# Patient Record
Sex: FEMALE | Race: WHITE | NOT HISPANIC OR LATINO | Employment: FULL TIME | URBAN - METROPOLITAN AREA
[De-identification: names, ages, dates, MRNs, and addresses within clinical notes are randomized per-mention and may not be internally consistent; named-entity substitution may affect disease eponyms.]

---

## 2017-01-11 ENCOUNTER — HOSPITAL ENCOUNTER (OUTPATIENT)
Dept: MAMMOGRAPHY | Facility: HOSPITAL | Age: 52
Discharge: HOME | End: 2017-01-11
Attending: NURSE PRACTITIONER | Admitting: NURSE PRACTITIONER

## 2018-10-24 ENCOUNTER — HOSPITAL ENCOUNTER (OUTPATIENT)
Dept: MAMMOGRAPHY | Facility: HOSPITAL | Age: 53
Discharge: HOME | End: 2018-10-24
Attending: NURSE PRACTITIONER | Admitting: NURSE PRACTITIONER

## 2019-10-18 ENCOUNTER — TRANSCRIBE ORDERS (OUTPATIENT)
Dept: ADMINISTRATIVE | Facility: HOSPITAL | Age: 54
End: 2019-10-18

## 2019-10-18 ENCOUNTER — LAB (OUTPATIENT)
Dept: LAB | Facility: HOSPITAL | Age: 54
End: 2019-10-18

## 2019-10-18 DIAGNOSIS — E11.69 TYPE 2 DIABETES MELLITUS WITH OTHER SPECIFIED COMPLICATION, UNSPECIFIED WHETHER LONG TERM INSULIN USE (HCC): ICD-10-CM

## 2019-10-18 DIAGNOSIS — R53.83 MALAISE AND FATIGUE: ICD-10-CM

## 2019-10-18 DIAGNOSIS — E55.9 VITAMIN D DEFICIENCY: ICD-10-CM

## 2019-10-18 DIAGNOSIS — E78.5 HYPERLIPIDEMIA, UNSPECIFIED HYPERLIPIDEMIA TYPE: ICD-10-CM

## 2019-10-18 DIAGNOSIS — R53.81 MALAISE AND FATIGUE: ICD-10-CM

## 2019-10-18 DIAGNOSIS — E11.69 TYPE 2 DIABETES MELLITUS WITH OTHER SPECIFIED COMPLICATION, UNSPECIFIED WHETHER LONG TERM INSULIN USE (HCC): Primary | ICD-10-CM

## 2019-10-18 LAB
25(OH)D3 SERPL-MCNC: 19.3 NG/ML (ref 30–100)
ALBUMIN SERPL-MCNC: 4.7 G/DL (ref 3.5–5.2)
ALBUMIN/GLOB SERPL: 1.3 G/DL
ALP SERPL-CCNC: 86 U/L (ref 39–117)
ALT SERPL W P-5'-P-CCNC: 17 U/L (ref 1–33)
ANION GAP SERPL CALCULATED.3IONS-SCNC: 16.5 MMOL/L (ref 5–15)
AST SERPL-CCNC: 16 U/L (ref 1–32)
BASOPHILS # BLD AUTO: 0.01 10*3/MM3 (ref 0–0.2)
BASOPHILS NFR BLD AUTO: 0.2 % (ref 0–1.5)
BILIRUB SERPL-MCNC: 0.5 MG/DL (ref 0.2–1.2)
BUN BLD-MCNC: 20 MG/DL (ref 6–20)
BUN/CREAT SERPL: 27 (ref 7–25)
CALCIUM SPEC-SCNC: 9.9 MG/DL (ref 8.6–10.5)
CHLORIDE SERPL-SCNC: 99 MMOL/L (ref 98–107)
CHOLEST SERPL-MCNC: 289 MG/DL (ref 0–200)
CO2 SERPL-SCNC: 22.5 MMOL/L (ref 22–29)
CREAT BLD-MCNC: 0.74 MG/DL (ref 0.57–1)
DEPRECATED RDW RBC AUTO: 42.3 FL (ref 37–54)
EOSINOPHIL # BLD AUTO: 0.21 10*3/MM3 (ref 0–0.4)
EOSINOPHIL NFR BLD AUTO: 3.2 % (ref 0.3–6.2)
ERYTHROCYTE [DISTWIDTH] IN BLOOD BY AUTOMATED COUNT: 12.2 % (ref 12.3–15.4)
GFR SERPL CREATININE-BSD FRML MDRD: 81 ML/MIN/1.73
GLOBULIN UR ELPH-MCNC: 3.6 GM/DL
GLUCOSE BLD-MCNC: 98 MG/DL (ref 65–99)
HCT VFR BLD AUTO: 41 % (ref 34–46.6)
HDLC SERPL-MCNC: 52 MG/DL (ref 40–60)
HGB BLD-MCNC: 14 G/DL (ref 12–15.9)
IMM GRANULOCYTES # BLD AUTO: 0.02 10*3/MM3 (ref 0–0.05)
IMM GRANULOCYTES NFR BLD AUTO: 0.3 % (ref 0–0.5)
LDLC SERPL CALC-MCNC: 177 MG/DL (ref 0–100)
LDLC/HDLC SERPL: 3.4 {RATIO}
LYMPHOCYTES # BLD AUTO: 1.96 10*3/MM3 (ref 0.7–3.1)
LYMPHOCYTES NFR BLD AUTO: 29.8 % (ref 19.6–45.3)
MCH RBC QN AUTO: 32.2 PG (ref 26.6–33)
MCHC RBC AUTO-ENTMCNC: 34.1 G/DL (ref 31.5–35.7)
MCV RBC AUTO: 94.3 FL (ref 79–97)
MONOCYTES # BLD AUTO: 0.62 10*3/MM3 (ref 0.1–0.9)
MONOCYTES NFR BLD AUTO: 9.4 % (ref 5–12)
NEUTROPHILS # BLD AUTO: 3.75 10*3/MM3 (ref 1.7–7)
NEUTROPHILS NFR BLD AUTO: 57.1 % (ref 42.7–76)
NRBC BLD AUTO-RTO: 0 /100 WBC (ref 0–0.2)
PLATELET # BLD AUTO: 246 10*3/MM3 (ref 140–450)
PMV BLD AUTO: 10.7 FL (ref 6–12)
POTASSIUM BLD-SCNC: 4.3 MMOL/L (ref 3.5–5.2)
PROT SERPL-MCNC: 8.3 G/DL (ref 6–8.5)
RBC # BLD AUTO: 4.35 10*6/MM3 (ref 3.77–5.28)
SODIUM BLD-SCNC: 138 MMOL/L (ref 136–145)
T4 FREE SERPL-MCNC: 1.29 NG/DL (ref 0.93–1.7)
TRIGL SERPL-MCNC: 300 MG/DL (ref 0–150)
TSH SERPL DL<=0.05 MIU/L-ACNC: 2.06 UIU/ML (ref 0.27–4.2)
VLDLC SERPL-MCNC: 60 MG/DL (ref 5–40)
WBC NRBC COR # BLD: 6.57 10*3/MM3 (ref 3.4–10.8)

## 2019-10-18 PROCEDURE — 36415 COLL VENOUS BLD VENIPUNCTURE: CPT

## 2019-10-18 PROCEDURE — 82306 VITAMIN D 25 HYDROXY: CPT

## 2019-10-18 PROCEDURE — 80061 LIPID PANEL: CPT

## 2019-10-18 PROCEDURE — 84443 ASSAY THYROID STIM HORMONE: CPT

## 2019-10-18 PROCEDURE — 84439 ASSAY OF FREE THYROXINE: CPT

## 2019-10-18 PROCEDURE — 80053 COMPREHEN METABOLIC PANEL: CPT

## 2019-10-18 PROCEDURE — 85025 COMPLETE CBC W/AUTO DIFF WBC: CPT

## 2019-10-24 ENCOUNTER — APPOINTMENT (OUTPATIENT)
Dept: GENERAL RADIOLOGY | Facility: HOSPITAL | Age: 54
End: 2019-10-24

## 2019-10-24 ENCOUNTER — HOSPITAL ENCOUNTER (EMERGENCY)
Facility: HOSPITAL | Age: 54
Discharge: HOME | End: 2019-10-24
Admitting: EMERGENCY MEDICINE

## 2019-10-24 VITALS
DIASTOLIC BLOOD PRESSURE: 80 MMHG | RESPIRATION RATE: 20 BRPM | TEMPERATURE: 98.2 F | SYSTOLIC BLOOD PRESSURE: 154 MMHG | BODY MASS INDEX: 39.14 KG/M2 | WEIGHT: 229.28 LBS | HEART RATE: 74 BPM | HEIGHT: 64 IN | OXYGEN SATURATION: 98 %

## 2019-10-24 DIAGNOSIS — S63.502A SPRAIN OF LEFT WRIST, INITIAL ENCOUNTER: Primary | ICD-10-CM

## 2019-10-24 DIAGNOSIS — S50.02XA CONTUSION OF LEFT ELBOW, INITIAL ENCOUNTER: ICD-10-CM

## 2019-10-24 PROCEDURE — 73110 X-RAY EXAM OF WRIST: CPT

## 2019-10-24 PROCEDURE — 73090 X-RAY EXAM OF FOREARM: CPT

## 2019-10-24 PROCEDURE — 73070 X-RAY EXAM OF ELBOW: CPT

## 2019-10-24 PROCEDURE — 99284 EMERGENCY DEPT VISIT MOD MDM: CPT

## 2019-10-24 RX ORDER — HYDROCODONE BITARTRATE AND ACETAMINOPHEN 5; 325 MG/1; MG/1
1 TABLET ORAL ONCE AS NEEDED
Status: DISCONTINUED | OUTPATIENT
Start: 2019-10-24 | End: 2019-10-25 | Stop reason: HOSPADM

## 2019-10-24 RX ORDER — HYDROCODONE BITARTRATE AND ACETAMINOPHEN 5; 325 MG/1; MG/1
1 TABLET ORAL EVERY 6 HOURS PRN
Qty: 8 TABLET | Refills: 0 | Status: SHIPPED | OUTPATIENT
Start: 2019-10-24 | End: 2021-05-19 | Stop reason: ALTCHOICE

## 2019-10-24 RX ADMIN — HYDROCODONE BITARTRATE AND ACETAMINOPHEN 1 TABLET: 5; 325 TABLET ORAL at 22:04

## 2019-10-25 NOTE — ED PROVIDER NOTES
Subjective   Patient is a 54-year-old white female with history of diabetes and high cholesterol who presents today with complaints of pain to her left arm.  She states she fell onto her left arm after she slipped while trying to close the door of her barn.  Complains of pain at the left wrist, forearm and elbow it is worse with movement.  She denies any numbness tingling or weakness in the arm or hand.  She denies any other injury or complaint.            Review of Systems   Musculoskeletal:        Left arm pain   Neurological: Negative for weakness and numbness.       Past Medical History:   Diagnosis Date   • Diabetes mellitus (CMS/HCC)    • Hyperlipidemia        Allergies   Allergen Reactions   • Tetanus Toxoid Rash   • Doxycycline Angioedema       Past Surgical History:   Procedure Laterality Date   • BLADDER SURGERY     • HYSTERECTOMY         History reviewed. No pertinent family history.    Social History     Socioeconomic History   • Marital status:      Spouse name: Not on file   • Number of children: Not on file   • Years of education: Not on file   • Highest education level: Not on file   Tobacco Use   • Smoking status: Never Smoker   Substance and Sexual Activity   • Alcohol use: No     Frequency: Never   • Drug use: No           Objective   Physical Exam   Constitutional: She appears well-developed.   Vital signs and triage nurse note reviewed.  Constitutional: Awake, alert; well-developed and well-nourished. No acute distress is noted.  Cardiovascular: Regular rate and rhythm  Pulmonary: Respiratory effort regular nonlabored  Musculoskeletal: Independent range of motion of all extremities.  Mild tenderness over the mid left forearm.  Range of motion of the left wrist and elbow elicits pain.  There is no obvious edema or ecchymosis.  No open wounds.  Distal neurovascular motor intact.  Neuro: Alert oriented x3, speech is clear and appropriate, GCS 15.    Skin: Flesh tone, warm, dry, intact; no  erythematous or petechial rash or lesion.        Procedures           ED Course      Labs Reviewed - No data to display  Xr Elbow 2 View Left    Result Date: 10/24/2019  Normal study.  Electronically Signed By-Ashutosh Zelaya On:10/24/2019 9:46 PM This report was finalized on [id number] by  Ashutosh Zelaya, .    Xr Forearm 2 View Left    Result Date: 10/24/2019  Normal study.  Electronically Signed By-Ashutosh Zelaya On:10/24/2019 9:47 PM This report was finalized on [id number] by  Ashutosh Zelaya, .    Xr Wrist 3+ View Left    Result Date: 10/24/2019  Normal study.  Electronically Signed By-Ashutosh Zelaya On:10/24/2019 9:45 PM This report was finalized on [id number] by  Ashutohs Zelaya, .    Medications   HYDROcodone-acetaminophen (NORCO) 5-325 MG per tablet 1 tablet (1 tablet Oral Given 10/24/19 2200)                 MDM  Number of Diagnoses or Management Options  Contusion of left elbow, initial encounter:   Sprain of left wrist, initial encounter:      Amount and/or Complexity of Data Reviewed  Tests in the radiology section of CPT®: ordered and reviewed    Risk of Complications, Morbidity, and/or Mortality  General comments: Comorbidities: Diabetes, high cholesterol  Differentials: Sprain, fracture, dislocation;this list is not all inclusive and does not constitute the entirety of considered causes    Patient had x-rays obtained.  She was given one Norco for pain.    The patient had a Velcro wrist splint, Ace wrap and sling applied.  Distal motor, sensory and vascular status intact after application.     Diagnosis and treatment plan discussed with patient.  Patient agreeable to plan.   I discussed findings with patient who voices understanding of discharge instructions, signs and symptoms requiring return to ED; discharged improved and in stable condition with follow up for re-evaluation.  Prescription for Norco.  Inspect reviewed.      Patient Progress  Patient progress: stable      Final diagnoses:   Sprain of left wrist,  initial encounter   Contusion of left elbow, initial encounter              Lorraine Singh, APRN  10/24/19 7325

## 2019-10-25 NOTE — DISCHARGE INSTRUCTIONS
Wear splint Ace wrap and sling until pain improves.  Use Tylenol ibuprofen as needed for pain.  Ice.  Elevate.  Follow-up with orthopedics if symptoms persist.  Return for new or worsening symptoms.

## 2019-10-25 NOTE — ED NOTES
Tripped and fell outside tonight. Landed on her left arm. Pain from left shoulder down     Leandra Daugherty RN  10/24/19 2043

## 2019-11-20 ENCOUNTER — CONSULT (OUTPATIENT)
Dept: CARDIOLOGY | Facility: CLINIC | Age: 54
End: 2019-11-20

## 2019-11-20 VITALS
DIASTOLIC BLOOD PRESSURE: 86 MMHG | WEIGHT: 224 LBS | OXYGEN SATURATION: 98 % | HEIGHT: 64 IN | BODY MASS INDEX: 38.24 KG/M2 | SYSTOLIC BLOOD PRESSURE: 135 MMHG | HEART RATE: 77 BPM

## 2019-11-20 DIAGNOSIS — Z91.89 MULTIPLE RISK FACTORS FOR CORONARY ARTERY DISEASE: ICD-10-CM

## 2019-11-20 DIAGNOSIS — E78.1 PURE HYPERTRIGLYCERIDEMIA: Primary | ICD-10-CM

## 2019-11-20 DIAGNOSIS — E11.9 TYPE 2 DIABETES MELLITUS WITHOUT COMPLICATION, WITHOUT LONG-TERM CURRENT USE OF INSULIN (HCC): ICD-10-CM

## 2019-11-20 DIAGNOSIS — I10 ESSENTIAL HYPERTENSION: ICD-10-CM

## 2019-11-20 PROCEDURE — 93000 ELECTROCARDIOGRAM COMPLETE: CPT | Performed by: NURSE PRACTITIONER

## 2019-11-20 PROCEDURE — 99203 OFFICE O/P NEW LOW 30 MIN: CPT | Performed by: NURSE PRACTITIONER

## 2019-11-20 RX ORDER — CHOLECALCIFEROL (VITAMIN D3) 125 MCG
500 CAPSULE ORAL DAILY
COMMUNITY
End: 2022-11-23

## 2019-11-20 RX ORDER — MULTIVIT WITH MINERALS/LUTEIN
1000 TABLET ORAL DAILY
COMMUNITY

## 2019-11-20 RX ORDER — PROPRANOLOL HCL 60 MG
CAPSULE, EXTENDED RELEASE 24HR ORAL NIGHTLY
COMMUNITY
Start: 2019-09-25 | End: 2020-11-18 | Stop reason: SDUPTHER

## 2019-11-20 RX ORDER — CHOLECALCIFEROL (VITAMIN D3) 1250 MCG
CAPSULE ORAL
COMMUNITY
Start: 2015-09-17

## 2019-11-20 RX ORDER — ACETAMINOPHEN 500 MG
TABLET ORAL NIGHTLY
COMMUNITY
Start: 2015-09-17

## 2019-11-20 RX ORDER — LISINOPRIL 5 MG/1
10 TABLET ORAL DAILY
COMMUNITY
Start: 2019-09-25 | End: 2020-06-24 | Stop reason: SDUPTHER

## 2019-11-20 RX ORDER — METFORMIN HYDROCHLORIDE 500 MG/1
TABLET, EXTENDED RELEASE ORAL
COMMUNITY
Start: 2019-09-25 | End: 2022-11-23 | Stop reason: ALTCHOICE

## 2019-11-20 RX ORDER — MULTIVITAMIN
TABLET ORAL
COMMUNITY
Start: 2012-11-07

## 2019-11-20 RX ORDER — ECHINACEA 400 MG
CAPSULE ORAL DAILY
COMMUNITY
Start: 2012-11-07

## 2019-11-20 RX ORDER — ESCITALOPRAM OXALATE 10 MG/1
5 TABLET ORAL DAILY
COMMUNITY
Start: 2019-09-25 | End: 2021-05-19 | Stop reason: ALTCHOICE

## 2019-11-20 RX ORDER — PIOGLITAZONEHYDROCHLORIDE 30 MG/1
TABLET ORAL
COMMUNITY
Start: 2019-09-25

## 2019-11-20 RX ORDER — FEXOFENADINE HCL 180 MG/1
TABLET ORAL AS NEEDED
COMMUNITY
Start: 2012-11-07

## 2019-11-20 RX ORDER — EZETIMIBE 10 MG/1
TABLET ORAL
COMMUNITY
Start: 2019-09-25 | End: 2019-11-21 | Stop reason: ALTCHOICE

## 2019-11-20 NOTE — PROGRESS NOTES
"Cardiology Office Consultation      Encounter Date:  11/20/2019    Patient ID:   Isidra Gann is a 54 y.o. female.    Reason For Consultation:  Dyslipidemia      History of Present Illness:  Dear  Tania Connell APRN    It was my pleasure to see Isidra in the office today.  As you are aware, she is a very pleasant 54-year-old  female with no known history of coronary artery disease.  She does have risk factors that include obesity, dyslipidemia, diabetes mellitus type 2 and paternal family history of heart disease.  She presents today in new consultation for dyslipidemia.  Patient reports that she is intolerant to all statins secondary to myalgias.  Lipids have been suboptimal on Trilipix and niacin's as well.  She is currently on monotherapy with Zetia.  Lipid panel 10/18/2019 with total cholesterol 289, triglycerides 300, .  Patient reports her diabetes is well controlled with A1c consistently less than 6.    Patient denies any recent symptoms of chest discomfort, shortness of breath, lower extremity edema, dizziness or lightheadedness.  We discussed dietary modifications for improvement of lipids.  Patient stated she is hesitant to begin PSK 9 secondary to adverse side effects she has read about.      Assessment & Plan    Impressions:  Dyslipidemia-specifically, hypertriglyceridemia and elevated LDL.  Diabetes mellitus type 2  Obesity-BMI 38.5  Paternal family history of coronary artery disease    Recommendations:  Reviewed options with patient including injectables.  At this point, would recommend best Vascepa 2 g p.o. twice daily.  Will give samples from our office.  Discontinue Zetia.  Recheck lipids in 8 weeks.  If lipids remain suboptimal, would recommend moving forward with PSK9-Repatha or Praluent.    Objective:    Vitals:  Vitals:    11/20/19 1316   BP: 135/86   Pulse: 77   SpO2: 98%   Weight: 102 kg (224 lb)   Height: 162.6 cm (64\")       ROS    Physical Exam:   "   General: Well-developed, obese 54-year-old  female with BMI 38.5 who is alert, cooperative, no distress, appears stated age  Head:  Normocephalic, atraumatic, mucous membranes moist  Eyes:  Conjunctiva/corneas clear, EOM's intact     Neck:  Supple,  no adenopathy;      Lungs: Clear to auscultation bilaterally, no wheezes rhonchi rales are noted  Chest wall: No tenderness  Heart::  Regular rate and rhythm, S1 and S2 normal, no murmur, rub or gallop  Abdomen: Soft, non-tender, nondistended bowel sounds active  Extremities: No cyanosis, clubbing, or edema  Pulses: 2+ and symmetric all extremities  Skin:  No rashes or lesions  Neuro/psych: A&O x3. CN II through XII are grossly intact with appropriate affect    History of Present Illness      Allergies:  Allergies   Allergen Reactions   • Tetanus Toxoid Rash   • Doxycycline Angioedema       Medication Review:     Current Outpatient Medications:   •  acetaminophen (TYLENOL) 500 MG tablet, Every Night., Disp: , Rfl:   •  Cholecalciferol (VITAMIN D3) 1.25 MG (99554 UT) capsule, VITAMIN D3 44466 UNIT CAPS, Disp: , Rfl:   •  escitalopram (LEXAPRO) 10 MG tablet, 5 mg Daily., Disp: , Rfl:   •  fexofenadine (KP FEXOFENADINE HCL) 180 MG tablet, As Needed., Disp: , Rfl:   •  Flaxseed, Linseed, (FLAXSEED OIL) 1000 MG capsule, Daily., Disp: , Rfl:   •  lisinopril (PRINIVIL,ZESTRIL) 5 MG tablet, , Disp: , Rfl:   •  metFORMIN ER (GLUCOPHAGE-XR) 500 MG 24 hr tablet, , Disp: , Rfl:   •  Multiple Vitamin tablet, MULTIPLE VITAMIN TABS, Disp: , Rfl:   •  pioglitazone (ACTOS) 30 MG tablet, , Disp: , Rfl:   •  propranolol LA (INDERAL LA) 60 MG 24 hr capsule, Every Night., Disp: , Rfl:   •  vitamin B-12 (CYANOCOBALAMIN) 500 MCG tablet, Take 500 mcg by mouth Daily., Disp: , Rfl:   •  vitamin E 1000 UNIT capsule, Take 1,000 Units by mouth Daily., Disp: , Rfl:   •  HYDROcodone-acetaminophen (NORCO) 5-325 MG per tablet, Take 1 tablet by mouth Every 6 (Six) Hours As Needed for Moderate  Pain ., Disp: 8 tablet, Rfl: 0    Family History:  Family History   Problem Relation Age of Onset   • Heart disease Father    • Hyperlipidemia Father    • Hypertension Brother        Past Medical History:  Past Medical History:   Diagnosis Date   • Diabetes mellitus (CMS/HCC)    • Hyperlipidemia        Past surgical History:  Past Surgical History:   Procedure Laterality Date   • BLADDER SURGERY     • HYSTERECTOMY         Social History:  Social History     Socioeconomic History   • Marital status:      Spouse name: Not on file   • Number of children: Not on file   • Years of education: Not on file   • Highest education level: Not on file   Tobacco Use   • Smoking status: Never Smoker   Substance and Sexual Activity   • Alcohol use: No     Frequency: Never   • Drug use: No       Review of Systems:  The following systems were reviewed as they relate to the cardiovascular system: Constitutional, Eyes, ENT, Cardiovascular, Respiratory, Gastrointestinal, Integumentary, Neurological, Psychiatric, Hematologic, Endocrine, Musculoskeletal, and Genitourinary. The pertinent cardiovascular findings are reported above with all other cardiovascular points within those systems being negative.    Diagnostic Study Review:     Current Electrocardiogram:    ECG 12 Lead  Date/Time: 11/20/2019 3:34 PM  Performed by: Aaliyah Alvarado APRN  Authorized by: Aaliyah Alvarado APRN   Comparison: not compared with previous ECG   Previous ECG: no previous ECG available  Rhythm: sinus rhythm  BPM: 74              Most recent Cardiac Catheterization:  Date:  Findings:    Most Recent Echocardiogram:  Date:  Findings:    Most Recent Stress Test:  Date:  Findings:    Most Recent Ambulatory ECG Monitor:  Date:  Findings:    NOTE: The following portions of the patient's history were reviewed and updated this visit as appropriate: allergies, current medications, past family history, past medical history, past social history, past  surgical history and problem list.

## 2019-11-21 PROBLEM — E11.9 TYPE 2 DIABETES MELLITUS (HCC): Status: ACTIVE | Noted: 2019-11-21

## 2019-11-21 PROBLEM — E78.5 HYPERLIPIDEMIA: Status: ACTIVE | Noted: 2019-11-21

## 2019-11-21 PROBLEM — I10 HYPERTENSION: Status: ACTIVE | Noted: 2018-12-07

## 2020-02-05 ENCOUNTER — OFFICE VISIT (OUTPATIENT)
Dept: CARDIOLOGY | Facility: CLINIC | Age: 55
End: 2020-02-05

## 2020-02-05 VITALS
DIASTOLIC BLOOD PRESSURE: 85 MMHG | HEIGHT: 64 IN | BODY MASS INDEX: 38.93 KG/M2 | HEART RATE: 69 BPM | SYSTOLIC BLOOD PRESSURE: 144 MMHG | WEIGHT: 228 LBS | OXYGEN SATURATION: 98 %

## 2020-02-05 DIAGNOSIS — E78.2 MIXED HYPERLIPIDEMIA: ICD-10-CM

## 2020-02-05 DIAGNOSIS — I10 ESSENTIAL HYPERTENSION: ICD-10-CM

## 2020-02-05 DIAGNOSIS — E11.9 TYPE 2 DIABETES MELLITUS WITHOUT COMPLICATION, WITHOUT LONG-TERM CURRENT USE OF INSULIN (HCC): ICD-10-CM

## 2020-02-05 DIAGNOSIS — I10 HTN (HYPERTENSION), BENIGN: Primary | ICD-10-CM

## 2020-02-05 PROCEDURE — 99214 OFFICE O/P EST MOD 30 MIN: CPT | Performed by: NURSE PRACTITIONER

## 2020-02-05 RX ORDER — ICOSAPENT ETHYL 1000 MG/1
2 CAPSULE ORAL 2 TIMES DAILY WITH MEALS
Qty: 120 CAPSULE | Refills: 3 | Status: SHIPPED | OUTPATIENT
Start: 2020-02-05 | End: 2021-05-19

## 2020-02-06 NOTE — PROGRESS NOTES
[facility] CARDIOLOGY      REASON FOR FOLLOW-UP:  Dyslipidemia  Hypertension          Chief Complaint   Patient presents with   • Hyperlipidemia     2 month f/u         Dear Tania,    History of Present Illness     It was my pleasure to see Isidra in the office today.  As you are aware, she is a very pleasant 54-year-old  female with no known history of coronary artery disease.  She does have risk factors that include obesity, dyslipidemia, diabetes mellitus type 2 and paternal family history of heart disease.  She presented in new consultation for dyslipidemia.  Patient reports that she is intolerant to all statins secondary to myalgias.  Lipids have been suboptimal on Trilipix and niacin's as well. She was on monotherapy with Zetia.  Lipid panel 10/18/2019 with total cholesterol 289, triglycerides 300, .  Patient reported her diabetes is well controlled with A1c consistently less than 6.  She was started on Vascepa and samples were given.  She presents today in follow-up for the above diagnoses.     Patient denies any recent symptoms of chest discomfort, shortness of breath, lower extremity edema, dizziness or lightheadedness.    Labs have been ordered but were not obtained.  Her blood pressure is somewhat elevated today at 144/85.  She has never had baseline echocardiogram    Assessment:  Dyslipidemia-specifically, hypertriglyceridemia and elevated LDL.  Diabetes mellitus type 2  Obesity-BMI 38.5  Paternal family history of coronary artery disease        Plan:  We will reorder labs  Patient is tolerating medication well  2D echo to evaluate for any structural heart changes      The following portions of the patient's history were reviewed and updated as appropriate: allergies, current medications, past family history, past medical history, past social history, past surgical history and problem list.    REVIEW OF SYSTEMS:    Review of Systems   All other systems reviewed  and are negative.      Vitals:    02/05/20 1444   BP: 144/85   Pulse: 69   SpO2: 98%         PHYSICAL EXAM:    General: Well-developed, obese 54-year-old  female with BMI of 39.1 who is alert, cooperative, no distress, appears stated age  Head:  Normocephalic, atraumatic, mucous membranes moist  Eyes:  Conjunctiva/corneas clear, EOM's intact     Neck:  Supple,  no JVD or bruit     Lungs: Clear to auscultation bilaterally, no wheezes rhonchi rales are noted  Chest wall: No tenderness  Musculoskeletal:   Ambulates freely without assistance  Heart::  Regular rate and rhythm, S1 and S2 normal, no murmur, rub or gallop  Abdomen: Soft, non-tender, nondistended bowel sounds active, no abdominal bruit  Extremities: No cyanosis, clubbing, or edema   Pulses: 2+ and symmetric all extremities  Skin:  No rashes or lesions  Neuro/psych: A&O x3. CN II through XII are grossly intact with appropriate affect        Past Medical History:   Diagnosis Date   • Diabetes mellitus (CMS/HCC)    • Hyperlipidemia        Past Surgical History:   Procedure Laterality Date   • BLADDER SURGERY     • HYSTERECTOMY           Current Outpatient Medications:   •  acetaminophen (TYLENOL) 500 MG tablet, Every Night., Disp: , Rfl:   •  Cholecalciferol (VITAMIN D3) 1.25 MG (09823 UT) capsule, VITAMIN D3 86767 UNIT CAPS, Disp: , Rfl:   •  escitalopram (LEXAPRO) 10 MG tablet, 5 mg Daily., Disp: , Rfl:   •  fexofenadine (KP FEXOFENADINE HCL) 180 MG tablet, As Needed., Disp: , Rfl:   •  Flaxseed, Linseed, (FLAXSEED OIL) 1000 MG capsule, Daily., Disp: , Rfl:   •  HYDROcodone-acetaminophen (NORCO) 5-325 MG per tablet, Take 1 tablet by mouth Every 6 (Six) Hours As Needed for Moderate Pain ., Disp: 8 tablet, Rfl: 0  •  lisinopril (PRINIVIL,ZESTRIL) 5 MG tablet, , Disp: , Rfl:   •  metFORMIN ER (GLUCOPHAGE-XR) 500 MG 24 hr tablet, , Disp: , Rfl:   •  Multiple Vitamin tablet, MULTIPLE VITAMIN TABS, Disp: , Rfl:   •  pioglitazone (ACTOS) 30 MG tablet, ,  Disp: , Rfl:   •  propranolol LA (INDERAL LA) 60 MG 24 hr capsule, Every Night., Disp: , Rfl:   •  vitamin B-12 (CYANOCOBALAMIN) 500 MCG tablet, Take 500 mcg by mouth Daily., Disp: , Rfl:   •  vitamin E 1000 UNIT capsule, Take 1,000 Units by mouth Daily., Disp: , Rfl:   •  icosapent ethyl (VASCEPA) 1 g capsule capsule, Take 2 g by mouth 2 (Two) Times a Day With Meals., Disp: 120 capsule, Rfl: 3    Allergies   Allergen Reactions   • Tetanus Toxoid Rash   • Doxycycline Angioedema       Family History   Problem Relation Age of Onset   • Heart disease Father    • Hyperlipidemia Father    • Hypertension Brother        Social History     Tobacco Use   • Smoking status: Never Smoker   • Smokeless tobacco: Never Used   Substance Use Topics   • Alcohol use: No     Frequency: Never           Current Electrocardiogram:  Procedures      ALEKSANDRA Lopes  02/06/20  1:22 PM

## 2020-02-07 ENCOUNTER — LAB (OUTPATIENT)
Dept: FAMILY MEDICINE CLINIC | Facility: CLINIC | Age: 55
End: 2020-02-07

## 2020-02-07 DIAGNOSIS — E78.1 PURE HYPERTRIGLYCERIDEMIA: ICD-10-CM

## 2020-02-07 DIAGNOSIS — E78.5 HYPERLIPIDEMIA, UNSPECIFIED HYPERLIPIDEMIA TYPE: Primary | ICD-10-CM

## 2020-02-07 DIAGNOSIS — E11.9 TYPE 2 DIABETES MELLITUS WITHOUT COMPLICATION, WITHOUT LONG-TERM CURRENT USE OF INSULIN (HCC): ICD-10-CM

## 2020-02-07 DIAGNOSIS — I10 HTN (HYPERTENSION), BENIGN: ICD-10-CM

## 2020-02-07 PROCEDURE — 80061 LIPID PANEL: CPT | Performed by: NURSE PRACTITIONER

## 2020-02-08 LAB
CHOLEST SERPL-MCNC: 310 MG/DL (ref 0–200)
HDLC SERPL-MCNC: 48 MG/DL (ref 40–60)
LDLC SERPL CALC-MCNC: 203 MG/DL (ref 0–100)
LDLC/HDLC SERPL: 4.22 {RATIO}
TRIGL SERPL-MCNC: 297 MG/DL (ref 0–150)
VLDLC SERPL-MCNC: 59.4 MG/DL (ref 5–40)

## 2020-02-10 ENCOUNTER — TELEPHONE (OUTPATIENT)
Dept: CARDIOLOGY | Facility: CLINIC | Age: 55
End: 2020-02-10

## 2020-02-10 DIAGNOSIS — E78.5 HYPERLIPIDEMIA, UNSPECIFIED HYPERLIPIDEMIA TYPE: Primary | ICD-10-CM

## 2020-02-10 RX ORDER — ATORVASTATIN CALCIUM 20 MG/1
20 TABLET, FILM COATED ORAL DAILY
Qty: 90 TABLET | Refills: 3 | Status: SHIPPED | OUTPATIENT
Start: 2020-02-10 | End: 2020-06-24 | Stop reason: SINTOL

## 2020-02-10 NOTE — TELEPHONE ENCOUNTER
Left VM for patient regarding elevation in lipid panel. Called in atorvastatin 20mg for patient to  at [facility] in [place] per VORV by IRINA LAIRD.

## 2020-06-24 ENCOUNTER — OFFICE VISIT (OUTPATIENT)
Dept: CARDIOLOGY | Facility: CLINIC | Age: 55
End: 2020-06-24

## 2020-06-24 VITALS
BODY MASS INDEX: 39.82 KG/M2 | WEIGHT: 232 LBS | TEMPERATURE: 98.5 F | HEART RATE: 80 BPM | SYSTOLIC BLOOD PRESSURE: 163 MMHG | DIASTOLIC BLOOD PRESSURE: 89 MMHG | OXYGEN SATURATION: 99 %

## 2020-06-24 DIAGNOSIS — E11.9 TYPE 2 DIABETES MELLITUS WITHOUT COMPLICATION, WITHOUT LONG-TERM CURRENT USE OF INSULIN (HCC): ICD-10-CM

## 2020-06-24 DIAGNOSIS — E78.2 MIXED HYPERLIPIDEMIA: Primary | ICD-10-CM

## 2020-06-24 DIAGNOSIS — I10 ESSENTIAL HYPERTENSION: ICD-10-CM

## 2020-06-24 DIAGNOSIS — E66.01 SEVERE OBESITY (BMI 35.0-39.9) WITH COMORBIDITY (HCC): ICD-10-CM

## 2020-06-24 PROCEDURE — 99213 OFFICE O/P EST LOW 20 MIN: CPT | Performed by: NURSE PRACTITIONER

## 2020-06-24 RX ORDER — LISINOPRIL 10 MG/1
10 TABLET ORAL DAILY
Qty: 30 TABLET | Refills: 5 | Status: SHIPPED | OUTPATIENT
Start: 2020-06-24 | End: 2020-12-14

## 2020-06-24 RX ORDER — FENOFIBRATE 48 MG/1
48 TABLET, COATED ORAL DAILY
Qty: 30 TABLET | Refills: 5 | Status: SHIPPED | OUTPATIENT
Start: 2020-06-24 | End: 2021-03-26

## 2020-06-24 NOTE — PROGRESS NOTES
[facility] CARDIOLOGY      REASON FOR FOLLOW-UP:  Hypertension  Dyslipidemia          Chief Complaint   Patient presents with   • Hypertension     F/U from OV in Feb-here for echo results.   • Hyperlipidemia     Could not tolerate Lipitor.         Dear Tania,        History of Present Illness     It was my pleasure to see Isidra in the office today.  As you are aware, she is a very pleasant 54-year-old  female with no known history of coronary artery disease.  She does have risk factors that include obesity, dyslipidemia, diabetes mellitus type 2 and paternal family history of heart disease.  She presented in new consultation for dyslipidemia.  Patient reports that she is intolerant to all statins secondary to myalgias.  Lipids have been suboptimal on Trilipix and niacin's as well. She was on monotherapy with Zetia.  Lipid panel 10/18/2019 with total cholesterol 289, triglycerides 300, .  Patient reported her diabetes is well controlled with A1c consistently less than 6.  She was started on Vascepa and samples were given.  She presents today in follow-up echo results.  2D echo shows mild to moderate asymmetric hypertrophy with EF 60%, mild TR.  Left atrial cavity mildly dilated..     Patient denies any recent symptoms of chest discomfort, shortness of breath, lower extremity edema, dizziness or lightheadedness.   Lipid panel shows triglycerides 297, .   2D echo results were reviewed with the patient in detail today.        Assessment:  Dyslipidemia-specifically, hypertriglyceridemia and elevated LDL.  Diabetes mellitus type 2  Obesity-BMI 38.5  Paternal family history of coronary artery disease  Hypertension with hypertensive cardiovascular disease    Plan:  We will start TriCor.  Patient will continue over-the-counter omega-3's as well.  She is intolerant to statins.  Recommend aggressive risk factor modification including weight loss  Increase lisinopril to 10 mg  once daily  Follow-up in 3 months        The following portions of the patient's history were reviewed and updated as appropriate: allergies, current medications, past family history, past medical history, past social history, past surgical history and problem list.    REVIEW OF SYSTEMS:    Review of Systems   Constitution: Positive for malaise/fatigue.   All other systems reviewed and are negative.      Vitals:    06/24/20 1322   BP: 163/89   Pulse: 80   Temp: 98.5 °F (36.9 °C)   SpO2: 99%         PHYSICAL EXAM:    General: Well-developed, obese 54-year-old  female who is alert, cooperative, no distress, appears stated age  Head:  Normocephalic, atraumatic, mucous membranes moist  Eyes:  Conjunctiva/corneas clear, EOM's intact     Neck:  Supple,  no JVD or bruit     Lungs: Clear to auscultation bilaterally, no wheezes rhonchi rales are noted  Chest wall: No tenderness  Musculoskeletal:   Ambulates freely without assistance  Heart::  Regular rate and rhythm, S1 and S2 normal, no murmur, rub or gallop  Abdomen: Soft, non-tender, nondistended, bowel sounds active, no abdominal bruit  Extremities: No cyanosis, clubbing, or edema   Pulses: 2+ and symmetric all extremities  Skin:  No rashes or lesions  Neuro/psych: A&O x3. CN II through XII are grossly intact with appropriate affect        Past Medical History:   Diagnosis Date   • Diabetes mellitus (CMS/HCC)    • Hyperlipidemia        Past Surgical History:   Procedure Laterality Date   • BLADDER SURGERY     • HYSTERECTOMY           Current Outpatient Medications:   •  acetaminophen (TYLENOL) 500 MG tablet, Every Night., Disp: , Rfl:   •  Cholecalciferol (VITAMIN D3) 1.25 MG (62391 UT) capsule, VITAMIN D3 39126 UNIT CAPS, Disp: , Rfl:   •  fexofenadine (KP FEXOFENADINE HCL) 180 MG tablet, As Needed., Disp: , Rfl:   •  Flaxseed, Linseed, (FLAXSEED OIL) 1000 MG capsule, Daily., Disp: , Rfl:   •  icosapent ethyl (VASCEPA) 1 g capsule capsule, Take 2 g by mouth 2  "(Two) Times a Day With Meals., Disp: 120 capsule, Rfl: 3  •  lisinopril (PRINIVIL,ZESTRIL) 10 MG tablet, Take 1 tablet by mouth Daily., Disp: 30 tablet, Rfl: 5  •  metFORMIN ER (GLUCOPHAGE-XR) 500 MG 24 hr tablet, , Disp: , Rfl:   •  Multiple Vitamin tablet, MULTIPLE VITAMIN TABS, Disp: , Rfl:   •  pioglitazone (ACTOS) 30 MG tablet, , Disp: , Rfl:   •  propranolol LA (INDERAL LA) 60 MG 24 hr capsule, Every Night., Disp: , Rfl:   •  vitamin B-12 (CYANOCOBALAMIN) 500 MCG tablet, Take 500 mcg by mouth Daily., Disp: , Rfl:   •  vitamin E 1000 UNIT capsule, Take 1,000 Units by mouth Daily., Disp: , Rfl:   •  escitalopram (LEXAPRO) 10 MG tablet, 5 mg Daily., Disp: , Rfl:   •  fenofibrate (TRICOR) 48 MG tablet, Take 1 tablet by mouth Daily., Disp: 30 tablet, Rfl: 5  •  HYDROcodone-acetaminophen (NORCO) 5-325 MG per tablet, Take 1 tablet by mouth Every 6 (Six) Hours As Needed for Moderate Pain ., Disp: 8 tablet, Rfl: 0    Allergies   Allergen Reactions   • Tetanus Toxoid Rash   • Atorvastatin GI Intolerance and Myalgia   • Doxycycline Angioedema       Family History   Problem Relation Age of Onset   • Heart disease Father    • Hyperlipidemia Father    • Hypertension Brother        Social History     Tobacco Use   • Smoking status: Never Smoker   • Smokeless tobacco: Never Used   Substance Use Topics   • Alcohol use: No     Frequency: Never           Current Electrocardiogram:  Procedures      Aaliyah Alvarado, ALEKSANDRA  06/26/20  16:27      EMR Dragon/Transcription:   \"Dictated utilizing Dragon dictation\".         "

## 2020-06-26 PROBLEM — E66.01 SEVERE OBESITY (BMI 35.0-39.9) WITH COMORBIDITY (HCC): Status: ACTIVE | Noted: 2020-06-26

## 2020-10-13 ENCOUNTER — LAB (OUTPATIENT)
Dept: LAB | Facility: HOSPITAL | Age: 55
End: 2020-10-13

## 2020-10-13 ENCOUNTER — TRANSCRIBE ORDERS (OUTPATIENT)
Dept: ADMINISTRATIVE | Facility: HOSPITAL | Age: 55
End: 2020-10-13

## 2020-10-13 DIAGNOSIS — E13.9: ICD-10-CM

## 2020-10-13 DIAGNOSIS — E13.9: Primary | ICD-10-CM

## 2020-10-13 DIAGNOSIS — E55.9 VITAMIN D DEFICIENCY, UNSPECIFIED: ICD-10-CM

## 2020-10-13 DIAGNOSIS — D64.9 ANEMIA, UNSPECIFIED TYPE: ICD-10-CM

## 2020-10-13 LAB
25(OH)D3 SERPL-MCNC: 30 NG/ML (ref 30–100)
ALBUMIN SERPL-MCNC: 4.2 G/DL (ref 3.5–5.2)
ALBUMIN/GLOB SERPL: 1.4 G/DL
ALP SERPL-CCNC: 87 U/L (ref 39–117)
ALT SERPL W P-5'-P-CCNC: 19 U/L (ref 1–33)
ANION GAP SERPL CALCULATED.3IONS-SCNC: 10.5 MMOL/L (ref 5–15)
AST SERPL-CCNC: 17 U/L (ref 1–32)
BASOPHILS # BLD AUTO: 0.02 10*3/MM3 (ref 0–0.2)
BASOPHILS NFR BLD AUTO: 0.3 % (ref 0–1.5)
BILIRUB SERPL-MCNC: 0.6 MG/DL (ref 0–1.2)
BUN SERPL-MCNC: 18 MG/DL (ref 6–20)
BUN/CREAT SERPL: 22.8 (ref 7–25)
CALCIUM SPEC-SCNC: 9.3 MG/DL (ref 8.6–10.5)
CHLORIDE SERPL-SCNC: 103 MMOL/L (ref 98–107)
CO2 SERPL-SCNC: 23.5 MMOL/L (ref 22–29)
CREAT SERPL-MCNC: 0.79 MG/DL (ref 0.57–1)
DEPRECATED RDW RBC AUTO: 41.1 FL (ref 37–54)
EOSINOPHIL # BLD AUTO: 0.21 10*3/MM3 (ref 0–0.4)
EOSINOPHIL NFR BLD AUTO: 3.3 % (ref 0.3–6.2)
ERYTHROCYTE [DISTWIDTH] IN BLOOD BY AUTOMATED COUNT: 12.4 % (ref 12.3–15.4)
GFR SERPL CREATININE-BSD FRML MDRD: 75 ML/MIN/1.73
GLOBULIN UR ELPH-MCNC: 2.9 GM/DL
GLUCOSE SERPL-MCNC: 115 MG/DL (ref 65–99)
HBA1C MFR BLD: 6 % (ref 3.5–5.6)
HCT VFR BLD AUTO: 38.1 % (ref 34–46.6)
HGB BLD-MCNC: 13 G/DL (ref 12–15.9)
IMM GRANULOCYTES # BLD AUTO: 0.03 10*3/MM3 (ref 0–0.05)
IMM GRANULOCYTES NFR BLD AUTO: 0.5 % (ref 0–0.5)
LYMPHOCYTES # BLD AUTO: 2.17 10*3/MM3 (ref 0.7–3.1)
LYMPHOCYTES NFR BLD AUTO: 34.5 % (ref 19.6–45.3)
MCH RBC QN AUTO: 31.4 PG (ref 26.6–33)
MCHC RBC AUTO-ENTMCNC: 34.1 G/DL (ref 31.5–35.7)
MCV RBC AUTO: 92 FL (ref 79–97)
MONOCYTES # BLD AUTO: 0.57 10*3/MM3 (ref 0.1–0.9)
MONOCYTES NFR BLD AUTO: 9.1 % (ref 5–12)
NEUTROPHILS NFR BLD AUTO: 3.29 10*3/MM3 (ref 1.7–7)
NEUTROPHILS NFR BLD AUTO: 52.3 % (ref 42.7–76)
NRBC BLD AUTO-RTO: 0 /100 WBC (ref 0–0.2)
PLATELET # BLD AUTO: 262 10*3/MM3 (ref 140–450)
PMV BLD AUTO: 10.4 FL (ref 6–12)
POTASSIUM SERPL-SCNC: 4.1 MMOL/L (ref 3.5–5.2)
PROT SERPL-MCNC: 7.1 G/DL (ref 6–8.5)
RBC # BLD AUTO: 4.14 10*6/MM3 (ref 3.77–5.28)
SODIUM SERPL-SCNC: 137 MMOL/L (ref 136–145)
T4 FREE SERPL-MCNC: 1.39 NG/DL (ref 0.93–1.7)
TSH SERPL DL<=0.05 MIU/L-ACNC: 1.72 UIU/ML (ref 0.27–4.2)
WBC # BLD AUTO: 6.29 10*3/MM3 (ref 3.4–10.8)

## 2020-10-13 PROCEDURE — 84443 ASSAY THYROID STIM HORMONE: CPT

## 2020-10-13 PROCEDURE — 84439 ASSAY OF FREE THYROXINE: CPT

## 2020-10-13 PROCEDURE — 83036 HEMOGLOBIN GLYCOSYLATED A1C: CPT

## 2020-10-13 PROCEDURE — 80053 COMPREHEN METABOLIC PANEL: CPT

## 2020-10-13 PROCEDURE — 82306 VITAMIN D 25 HYDROXY: CPT

## 2020-10-13 PROCEDURE — 36415 COLL VENOUS BLD VENIPUNCTURE: CPT

## 2020-10-13 PROCEDURE — 85025 COMPLETE CBC W/AUTO DIFF WBC: CPT

## 2020-11-10 ENCOUNTER — TRANSCRIBE ORDERS (OUTPATIENT)
Dept: ADMINISTRATIVE | Facility: HOSPITAL | Age: 55
End: 2020-11-10

## 2020-11-10 DIAGNOSIS — Z12.31 SCREENING MAMMOGRAM, ENCOUNTER FOR: Primary | ICD-10-CM

## 2020-11-18 ENCOUNTER — OFFICE VISIT (OUTPATIENT)
Dept: CARDIOLOGY | Facility: CLINIC | Age: 55
End: 2020-11-18

## 2020-11-18 VITALS
SYSTOLIC BLOOD PRESSURE: 132 MMHG | WEIGHT: 230 LBS | BODY MASS INDEX: 39.48 KG/M2 | OXYGEN SATURATION: 98 % | DIASTOLIC BLOOD PRESSURE: 84 MMHG | HEART RATE: 77 BPM

## 2020-11-18 DIAGNOSIS — E11.9 TYPE 2 DIABETES MELLITUS WITHOUT COMPLICATION, WITHOUT LONG-TERM CURRENT USE OF INSULIN (HCC): ICD-10-CM

## 2020-11-18 DIAGNOSIS — E66.01 SEVERE OBESITY (BMI 35.0-39.9) WITH COMORBIDITY (HCC): ICD-10-CM

## 2020-11-18 DIAGNOSIS — I10 ESSENTIAL HYPERTENSION: ICD-10-CM

## 2020-11-18 DIAGNOSIS — Z82.49 FAMILY HISTORY OF CORONARY ARTERY DISEASE IN FATHER: ICD-10-CM

## 2020-11-18 DIAGNOSIS — E78.2 MIXED HYPERLIPIDEMIA: Primary | ICD-10-CM

## 2020-11-18 PROCEDURE — 99213 OFFICE O/P EST LOW 20 MIN: CPT | Performed by: NURSE PRACTITIONER

## 2020-11-18 PROCEDURE — 93000 ELECTROCARDIOGRAM COMPLETE: CPT | Performed by: NURSE PRACTITIONER

## 2020-11-18 RX ORDER — CALCIUM CARBONATE/VITAMIN D3 600 MG-10
TABLET ORAL DAILY
COMMUNITY

## 2020-11-18 RX ORDER — PROPRANOLOL HCL 60 MG
60 CAPSULE, EXTENDED RELEASE 24HR ORAL DAILY
Qty: 90 CAPSULE | Refills: 2 | Status: SHIPPED | OUTPATIENT
Start: 2020-11-18 | End: 2021-10-05

## 2020-11-18 NOTE — PROGRESS NOTES
[facility] CARDIOLOGY      REASON FOR FOLLOW-UP:  Hypertension  Dyslipidemia          Chief Complaint   Patient presents with   • Hypertension     3-6 month f/u   • Hyperlipidemia         Dear Tania,    History of Present Illness     It was my pleasure to see Isidra in the office today.  As you are aware, she is a very pleasant 55-year-old  female with no known history of coronary artery disease.  She does have risk factors that include obesity, dyslipidemia, diabetes mellitus type 2, hypertension and paternal family history of heart disease.  She is intolerant to all statins secondary to myalgias.  Lipid panel 10/18/2019 with total cholesterol 289, triglycerides 300, .  Patient reported her diabetes is well controlled with A1c consistently less than 6.  She was started on TriCor and continue over-the-counter omega-3's.  Recent 2D echo shows mild to moderate asymmetric hypertrophy with EF 60%, mild TR.  Left atrial cavity mildly dilated.. She presents today in follow-up of medication change.    Today, the patient reports that she feels well.  Specifically, she denies any chest pains, pressure, tightness or palpitations.  She denies any shortness of breath at rest, dyspnea with exertion, orthopnea or PND.  She has had no lower extremity edema, dizziness or lightheadedness.  Blood pressure is under excellent control at 132/84.  She had recent labs, unfortunately that did not include lipids.  A1c was quite good at 6.0       Assessment:  Dyslipidemia  Hypertension  Diabetes mellitus 2  Obesity  Intolerance to statins  Risk factors for coronary artery disease  Family history of coronary artery disease  Hypertensive cardiovascular disease    Plan:  Lipids tomorrow  Continue current medications  Continue aggressive risk factor modification  Follow-up in 6 months or sooner if needed        The following portions of the patient's history were reviewed and updated as appropriate:  allergies, current medications, past family history, past medical history, past social history, past surgical history and problem list.    REVIEW OF SYSTEMS:    Review of Systems   All other systems reviewed and are negative.      Vitals:    11/18/20 1312   BP: 132/84   Pulse: 77   SpO2: 98%         PHYSICAL EXAM:    General: Alert, cooperative, no distress, appears stated age  Head:  Normocephalic, atraumatic, mucous membranes moist  Eyes:  Conjunctiva/corneas clear, EOM's intact     Neck:  Supple,  no JVD or bruit     Lungs: Clear to auscultation bilaterally, no wheezes rhonchi rales are noted  Chest wall: No tenderness  Musculoskeletal:   Ambulates freely without assistance  Heart::  Regular rate and rhythm, S1 and S2 normal, no murmur, rub or gallop  Abdomen: Soft, non-tender, nondistended, bowel sounds active, no abdominal bruit  Extremities: No cyanosis, clubbing, or edema   Pulses: 2+ and symmetric all extremities  Skin:  No rashes or lesions  Neuro/psych: A&O x3. CN II through XII are grossly intact with appropriate affect        Past Medical History:   Diagnosis Date   • Diabetes mellitus (CMS/HCC)    • Hyperlipidemia        Past Surgical History:   Procedure Laterality Date   • BLADDER SURGERY     • HYSTERECTOMY           Current Outpatient Medications:   •  acetaminophen (TYLENOL) 500 MG tablet, Every Night., Disp: , Rfl:   •  Cholecalciferol (VITAMIN D3) 1.25 MG (24827 UT) capsule, VITAMIN D3 20983 UNIT CAPS, Disp: , Rfl:   •  fenofibrate (TRICOR) 48 MG tablet, Take 1 tablet by mouth Daily., Disp: 30 tablet, Rfl: 5  •  fexofenadine (KP FEXOFENADINE HCL) 180 MG tablet, As Needed., Disp: , Rfl:   •  Flaxseed, Linseed, (FLAXSEED OIL) 1000 MG capsule, Daily., Disp: , Rfl:   •  lisinopril (PRINIVIL,ZESTRIL) 10 MG tablet, Take 1 tablet by mouth Daily., Disp: 30 tablet, Rfl: 5  •  metFORMIN ER (GLUCOPHAGE-XR) 500 MG 24 hr tablet, , Disp: , Rfl:   •  Multiple Vitamin tablet, MULTIPLE VITAMIN TABS, Disp: , Rfl:  "  •  Omega 3 1200 MG capsule, Take  by mouth Daily., Disp: , Rfl:   •  pioglitazone (ACTOS) 30 MG tablet, , Disp: , Rfl:   •  propranolol LA (INDERAL LA) 60 MG 24 hr capsule, Every Night., Disp: , Rfl:   •  vitamin B-12 (CYANOCOBALAMIN) 500 MCG tablet, Take 500 mcg by mouth Daily., Disp: , Rfl:   •  vitamin E 1000 UNIT capsule, Take 1,000 Units by mouth Daily., Disp: , Rfl:   •  escitalopram (LEXAPRO) 10 MG tablet, 5 mg Daily., Disp: , Rfl:   •  HYDROcodone-acetaminophen (NORCO) 5-325 MG per tablet, Take 1 tablet by mouth Every 6 (Six) Hours As Needed for Moderate Pain ., Disp: 8 tablet, Rfl: 0  •  icosapent ethyl (VASCEPA) 1 g capsule capsule, Take 2 g by mouth 2 (Two) Times a Day With Meals., Disp: 120 capsule, Rfl: 3    Allergies   Allergen Reactions   • Tetanus Toxoid Rash   • Atorvastatin GI Intolerance and Myalgia   • Doxycycline Angioedema       Family History   Problem Relation Age of Onset   • Heart disease Father    • Hyperlipidemia Father    • Hypertension Brother        Social History     Tobacco Use   • Smoking status: Never Smoker   • Smokeless tobacco: Never Used   Substance Use Topics   • Alcohol use: No     Frequency: Never           Current Electrocardiogram:    ECG 12 Lead    Date/Time: 11/18/2020 2:28 PM  Performed by: Aaliyah Alvarado APRN  Authorized by: Aaliyah Alvarado APRN   Comparison: not compared with previous ECG   Rhythm: sinus rhythm  BPM: 75                  EMR Dragon/Transcription:   \"Dictated utilizing Dragon dictation\".         "

## 2020-11-19 ENCOUNTER — LAB (OUTPATIENT)
Dept: FAMILY MEDICINE CLINIC | Facility: CLINIC | Age: 55
End: 2020-11-19

## 2020-11-19 DIAGNOSIS — E78.2 MIXED HYPERLIPIDEMIA: ICD-10-CM

## 2020-11-19 LAB
CHOLEST SERPL-MCNC: 288 MG/DL (ref 0–200)
HDLC SERPL-MCNC: 59 MG/DL (ref 40–60)
LDLC SERPL CALC-MCNC: 186 MG/DL (ref 0–100)
LDLC/HDLC SERPL: 3.11 {RATIO}
TRIGL SERPL-MCNC: 227 MG/DL (ref 0–150)
VLDLC SERPL-MCNC: 43 MG/DL (ref 5–40)

## 2020-11-19 PROCEDURE — 80061 LIPID PANEL: CPT | Performed by: NURSE PRACTITIONER

## 2020-11-19 PROCEDURE — 36415 COLL VENOUS BLD VENIPUNCTURE: CPT | Performed by: NURSE PRACTITIONER

## 2020-12-02 ENCOUNTER — APPOINTMENT (OUTPATIENT)
Dept: MAMMOGRAPHY | Facility: HOSPITAL | Age: 55
End: 2020-12-02

## 2020-12-09 ENCOUNTER — HOSPITAL ENCOUNTER (OUTPATIENT)
Dept: MAMMOGRAPHY | Facility: HOSPITAL | Age: 55
Discharge: HOME | End: 2020-12-09
Admitting: NURSE PRACTITIONER

## 2020-12-09 DIAGNOSIS — Z12.31 SCREENING MAMMOGRAM, ENCOUNTER FOR: ICD-10-CM

## 2020-12-09 PROCEDURE — 77063 BREAST TOMOSYNTHESIS BI: CPT

## 2020-12-09 PROCEDURE — 77067 SCR MAMMO BI INCL CAD: CPT

## 2020-12-14 RX ORDER — LISINOPRIL 10 MG/1
10 TABLET ORAL DAILY
Qty: 30 TABLET | Refills: 5 | Status: SHIPPED | OUTPATIENT
Start: 2020-12-14 | End: 2021-06-15

## 2021-03-26 RX ORDER — FENOFIBRATE 48 MG/1
48 TABLET, COATED ORAL DAILY
Qty: 90 TABLET | Refills: 1 | Status: SHIPPED | OUTPATIENT
Start: 2021-03-26

## 2021-05-19 ENCOUNTER — OFFICE VISIT (OUTPATIENT)
Dept: CARDIOLOGY | Facility: CLINIC | Age: 56
End: 2021-05-19

## 2021-05-19 VITALS
SYSTOLIC BLOOD PRESSURE: 117 MMHG | DIASTOLIC BLOOD PRESSURE: 79 MMHG | OXYGEN SATURATION: 95 % | BODY MASS INDEX: 39.48 KG/M2 | WEIGHT: 230 LBS | HEART RATE: 75 BPM

## 2021-05-19 DIAGNOSIS — E66.01 SEVERE OBESITY (BMI 35.0-39.9) WITH COMORBIDITY (HCC): ICD-10-CM

## 2021-05-19 DIAGNOSIS — Z91.89 MULTIPLE RISK FACTORS FOR CORONARY ARTERY DISEASE: ICD-10-CM

## 2021-05-19 DIAGNOSIS — E78.2 MIXED HYPERLIPIDEMIA: Primary | ICD-10-CM

## 2021-05-19 DIAGNOSIS — I10 ESSENTIAL HYPERTENSION: ICD-10-CM

## 2021-05-19 PROCEDURE — 99213 OFFICE O/P EST LOW 20 MIN: CPT | Performed by: NURSE PRACTITIONER

## 2021-05-19 PROCEDURE — 93000 ELECTROCARDIOGRAM COMPLETE: CPT | Performed by: NURSE PRACTITIONER

## 2021-05-19 RX ORDER — CLONAZEPAM 0.25 MG/1
0.25 TABLET, ORALLY DISINTEGRATING ORAL NIGHTLY PRN
COMMUNITY

## 2021-05-19 NOTE — PROGRESS NOTES
[facility] CARDIOLOGY      REASON FOR FOLLOW-UP:  Hypertension  Dyslipidemia             Chief Complaint   Patient presents with   • Hypertension   • Hyperlipidemia         Dear Tania Connell APRN        History of Present Illness     It was my pleasure to see Isidra in the office today.  As you are aware, she is a very pleasant 55-year-old  female with no known history of coronary artery disease.  She does have risk factors that include obesity, dyslipidemia, diabetes mellitus type 2, hypertension and paternal family history of heart disease.  She is intolerant to all statins secondary to myalgias.  Lipid panel 10/18/2019 with total cholesterol 289, triglycerides 300, .  Patient reported her diabetes is well controlled with A1c consistently less than 6.  She was started on TriCor and continue over-the-counter omega-3's.  Recent 2D echo shows mild to moderate asymmetric hypertrophy with EF 60%, mild TR.  Left atrial cavity mildly dilated.. She presents today in follow-up of medication change.    Patient's blood pressure today is under excellent control at 117/79.  She denies any symptoms of chest pain, pressure or tightness.  She denies any change in respiratory status.  She does have lower extremity edema today particularly dorsum of both feet.  She denies dizziness or lightheadedness.  No abnormal bleeding.    ASSESSMENT:  Dyslipidemia  Hypertension  Diabetes mellitus 2  Obesity  Intolerance to statins  Risk factors for coronary artery disease  Family history of coronary artery disease  Hypertensive cardiovascular disease        PLAN:  Continue aggressive risk factor modification  Continue current CV plan of care  Close monitoring of fluid and sodium intake  Follow-up in 6 months or sooner if needed        The following portions of the patient's history were reviewed and updated as appropriate: allergies, current medications, past family history, past medical  history, past social history, past surgical history and problem list.    REVIEW OF SYSTEMS:    Review of Systems   Cardiovascular: Positive for leg swelling.   All other systems reviewed and are negative.      Vitals:    05/19/21 1413   BP: 117/79   Pulse: 75   SpO2: 95%         PHYSICAL EXAM:    General: Alert, cooperative, no distress, appears stated age  Head:  Normocephalic, atraumatic, mucous membranes moist  Eyes:  Conjunctiva/corneas clear, EOM's intact     Neck:  Supple,  no JVD or bruit     Lungs: Clear to auscultation bilaterally, no wheezes rhonchi rales are noted  Chest wall: No tenderness  Musculoskeletal:   Ambulates freely without assistance  Heart::  Regular rate and rhythm, S1 and S2 normal, no murmur, rub or gallop  Abdomen: Soft, non-tender, nondistended, bowel sounds active, no abdominal bruit  Extremities: No cyanosis, clubbing, or edema   Pulses: 2+ and symmetric all extremities  Skin:  No rashes or lesions  Neuro/psych: A&O x3. CN II through XII are grossly intact with appropriate affect        Past Medical History:   Diagnosis Date   • Diabetes mellitus (CMS/HCC)    • Hyperlipidemia        Past Surgical History:   Procedure Laterality Date   • BLADDER SURGERY     • HYSTERECTOMY           Current Outpatient Medications:   •  acetaminophen (TYLENOL) 500 MG tablet, Every Night., Disp: , Rfl:   •  Cholecalciferol (VITAMIN D3) 1.25 MG (06988 UT) capsule, VITAMIN D3 07803 UNIT CAPS, Disp: , Rfl:   •  clonazePAM (KlonoPIN) 0.25 MG disintegrating tablet, Place 0.25 mg on the tongue At Night As Needed., Disp: , Rfl:   •  fenofibrate (TRICOR) 48 MG tablet, TAKE 1 TABLET BY MOUTH DAILY., Disp: 90 tablet, Rfl: 1  •  fexofenadine (KP FEXOFENADINE HCL) 180 MG tablet, As Needed., Disp: , Rfl:   •  Flaxseed, Linseed, (FLAXSEED OIL) 1000 MG capsule, Daily., Disp: , Rfl:   •  lisinopril (PRINIVIL,ZESTRIL) 10 MG tablet, TAKE 1 TABLET BY MOUTH DAILY., Disp: 30 tablet, Rfl: 5  •  metFORMIN ER (GLUCOPHAGE-XR) 500  "MG 24 hr tablet, , Disp: , Rfl:   •  Multiple Vitamin tablet, MULTIPLE VITAMIN TABS, Disp: , Rfl:   •  Omega 3 1200 MG capsule, Take  by mouth Daily., Disp: , Rfl:   •  pioglitazone (ACTOS) 30 MG tablet, , Disp: , Rfl:   •  propranolol LA (INDERAL LA) 60 MG 24 hr capsule, Take 1 capsule by mouth Daily., Disp: 90 capsule, Rfl: 2  •  vitamin B-12 (CYANOCOBALAMIN) 500 MCG tablet, Take 500 mcg by mouth Daily., Disp: , Rfl:   •  vitamin E 1000 UNIT capsule, Take 1,000 Units by mouth Daily., Disp: , Rfl:   •  escitalopram (LEXAPRO) 10 MG tablet, 5 mg Daily., Disp: , Rfl:   •  HYDROcodone-acetaminophen (NORCO) 5-325 MG per tablet, Take 1 tablet by mouth Every 6 (Six) Hours As Needed for Moderate Pain ., Disp: 8 tablet, Rfl: 0  •  icosapent ethyl (VASCEPA) 1 g capsule capsule, Take 2 g by mouth 2 (Two) Times a Day With Meals., Disp: 120 capsule, Rfl: 3    Allergies   Allergen Reactions   • Tetanus Toxoid Rash   • Atorvastatin GI Intolerance and Myalgia   • Doxycycline Angioedema       Family History   Problem Relation Age of Onset   • Heart disease Father    • Hyperlipidemia Father    • Hypertension Brother        Social History     Tobacco Use   • Smoking status: Never Smoker   • Smokeless tobacco: Never Used   Substance Use Topics   • Alcohol use: No           Current Electrocardiogram:    ECG 12 Lead    Date/Time: 5/19/2021 3:31 PM  Performed by: Aaliyah Alvarado APRN  Authorized by: Aaliyah Alvarado APRN   Comparison: compared with previous ECG from 11/18/2020  Rhythm: sinus rhythm  BPM: 75                  EMR Dragon/Transcription:   \"Dictated utilizing Dragon dictation\".       "

## 2021-06-15 RX ORDER — LISINOPRIL 10 MG/1
10 TABLET ORAL DAILY
Qty: 90 TABLET | Refills: 1 | Status: SHIPPED | OUTPATIENT
Start: 2021-06-15 | End: 2021-11-08

## 2021-09-15 DIAGNOSIS — E78.1 HYPERTRIGLYCERIDEMIA: ICD-10-CM

## 2021-09-15 DIAGNOSIS — E11.9 TYPE 2 DIABETES MELLITUS WITHOUT COMPLICATION, WITHOUT LONG-TERM CURRENT USE OF INSULIN (HCC): ICD-10-CM

## 2021-09-15 DIAGNOSIS — E78.2 MIXED HYPERLIPIDEMIA: Primary | ICD-10-CM

## 2021-09-15 DIAGNOSIS — E66.01 SEVERE OBESITY (BMI 35.0-39.9) WITH COMORBIDITY (HCC): ICD-10-CM

## 2021-10-04 DIAGNOSIS — Z91.89 MULTIPLE RISK FACTORS FOR CORONARY ARTERY DISEASE: Primary | ICD-10-CM

## 2021-10-04 DIAGNOSIS — E78.1 PURE HYPERTRIGLYCERIDEMIA: ICD-10-CM

## 2021-10-05 RX ORDER — PROPRANOLOL HCL 60 MG
CAPSULE, EXTENDED RELEASE 24HR ORAL
Qty: 90 CAPSULE | Refills: 2 | Status: SHIPPED | OUTPATIENT
Start: 2021-10-05 | End: 2022-05-05

## 2021-11-08 RX ORDER — LISINOPRIL 10 MG/1
10 TABLET ORAL DAILY
Qty: 90 TABLET | Refills: 1 | Status: SHIPPED | OUTPATIENT
Start: 2021-11-08 | End: 2022-05-05

## 2021-11-10 ENCOUNTER — OFFICE VISIT (OUTPATIENT)
Dept: CARDIOLOGY | Facility: CLINIC | Age: 56
End: 2021-11-10

## 2021-11-10 VITALS
HEART RATE: 70 BPM | OXYGEN SATURATION: 96 % | WEIGHT: 229 LBS | DIASTOLIC BLOOD PRESSURE: 78 MMHG | SYSTOLIC BLOOD PRESSURE: 118 MMHG | BODY MASS INDEX: 39.31 KG/M2

## 2021-11-10 DIAGNOSIS — Z82.49 FAMILY HISTORY OF CORONARY ARTERY DISEASE IN FATHER: Primary | ICD-10-CM

## 2021-11-10 DIAGNOSIS — E78.2 MIXED HYPERLIPIDEMIA: ICD-10-CM

## 2021-11-10 DIAGNOSIS — I10 ESSENTIAL HYPERTENSION: ICD-10-CM

## 2021-11-10 PROCEDURE — 93000 ELECTROCARDIOGRAM COMPLETE: CPT | Performed by: NURSE PRACTITIONER

## 2021-11-10 PROCEDURE — 99213 OFFICE O/P EST LOW 20 MIN: CPT | Performed by: NURSE PRACTITIONER

## 2021-11-10 NOTE — PROGRESS NOTES
[facility] CARDIOLOGY      REASON FOR FOLLOW-UP:  Hypertension  Dyslipidemia          Chief Complaint   Patient presents with   • Hypertension     6 month f/u   • Hyperlipidemia         Dear Tania Connell APRN        History of Present Illness   It was my pleasure to see Isidra in the office today.  As you are aware, she is a very pleasant 56-year-old  female with no known history of coronary artery disease.  She does have risk factors that include obesity, dyslipidemia, diabetes mellitus type 2, hypertension and paternal family history of heart disease.  She is intolerant to all statins secondary to myalgias.  Lipid panel  Patient reported her diabetes is well controlled with A1c consistently less than 6.  She was started on TriCor and continue over-the-counter omega-3's.  Recent 2D echo shows mild to moderate asymmetric hypertrophy with EF 60%, mild TR.  Left atrial cavity mildly dilated.. Lipids obtained 9/7/2021 again elevated with , triglycerides 417.  Patient was prescribed Repatha.  She presents today in follow-up for the above-mentioned diagnoses.    Today, Isidra reports that she feels well.  Specifically, she denies any complaints of chest discomfort or shortness of breath.  She has had no lower extremity edema, dizziness or lightheadedness.  She denies any complaints at all today.  Her blood pressure is under excellent control at 118/78.  I reviewed recent lipids with the patient.         ASSESSMENT:  Dyslipidemia  Hypertension  Diabetes mellitus 2  Obesity  Intolerance to statins  Risk factors for coronary artery disease  Family history of coronary artery disease  Hypertensive cardiovascular disease        PLAN:  Continue risk factor modification  Continue current CV plan of care  Recheck lipids January 2022  Continue to monitor fluid and sodium intake  Follow-up in 6 months or sooner if needed        The following portions of the patient's history were  reviewed and updated as appropriate: allergies, current medications, past family history, past medical history, past social history, past surgical history and problem list.    REVIEW OF SYSTEMS:    Review of Systems   All other systems reviewed and are negative.      Vitals:    11/10/21 1521   BP: 118/78   Pulse: 70   SpO2: 96%         PHYSICAL EXAM:    General: Alert, cooperative, no distress, appears stated age  Head:  Normocephalic, atraumatic, mucous membranes moist  Eyes:  Conjunctiva/corneas clear, EOM's intact     Neck:  Supple,  no JVD or bruit     Lungs: Clear to auscultation bilaterally, no wheezes rhonchi rales are noted  Chest wall: No tenderness  Musculoskeletal:   Ambulates freely without assistance  Heart::  Regular rate and rhythm, S1 and S2 normal, no murmur, rub or gallop  Abdomen: Soft, non-tender, nondistended, bowel sounds active, no abdominal bruit  Extremities: No cyanosis, clubbing, or edema   Pulses: 2+ and symmetric all extremities  Skin:  No rashes or lesions  Neuro/psych: A&O x3. CN II through XII are grossly intact with appropriate affect        Past Medical History:   Diagnosis Date   • Diabetes mellitus (HCC)    • Hyperlipidemia        Past Surgical History:   Procedure Laterality Date   • BLADDER SURGERY     • HYSTERECTOMY           Current Outpatient Medications:   •  acetaminophen (TYLENOL) 500 MG tablet, Every Night., Disp: , Rfl:   •  Cholecalciferol (VITAMIN D3) 1.25 MG (89846 UT) capsule, VITAMIN D3 55981 UNIT CAPS, Disp: , Rfl:   •  clonazePAM (KlonoPIN) 0.25 MG disintegrating tablet, Place 0.25 mg on the tongue At Night As Needed., Disp: , Rfl:   •  Evolocumab (REPATHA) solution auto-injector SureClick injection, Inject 1 mL under the skin into the appropriate area as directed Every 14 (Fourteen) Days., Disp: 1 mL, Rfl: 2  •  fenofibrate (TRICOR) 48 MG tablet, TAKE 1 TABLET BY MOUTH DAILY., Disp: 90 tablet, Rfl: 1  •  fexofenadine (KP FEXOFENADINE HCL) 180 MG tablet, As  "Needed., Disp: , Rfl:   •  Flaxseed, Linseed, (FLAXSEED OIL) 1000 MG capsule, Daily., Disp: , Rfl:   •  lisinopril (PRINIVIL,ZESTRIL) 10 MG tablet, TAKE 1 TABLET BY MOUTH DAILY., Disp: 90 tablet, Rfl: 1  •  metFORMIN ER (GLUCOPHAGE-XR) 500 MG 24 hr tablet, , Disp: , Rfl:   •  Multiple Vitamin tablet, MULTIPLE VITAMIN TABS, Disp: , Rfl:   •  Omega 3 1200 MG capsule, Take  by mouth Daily., Disp: , Rfl:   •  pioglitazone (ACTOS) 30 MG tablet, , Disp: , Rfl:   •  propranolol LA (INDERAL LA) 60 MG 24 hr capsule, TAKE 1 CAPSULE BY MOUTH EVERY DAY, Disp: 90 capsule, Rfl: 2  •  vitamin B-12 (CYANOCOBALAMIN) 500 MCG tablet, Take 500 mcg by mouth Daily., Disp: , Rfl:   •  vitamin E 1000 UNIT capsule, Take 1,000 Units by mouth Daily., Disp: , Rfl:     Current Facility-Administered Medications:   •  Evolocumab (REPATHA) injection 140 mg, 140 mg, Subcutaneous, Q14 Days, Aaliyah Alvarado APRN    Allergies   Allergen Reactions   • Tetanus Toxoid Rash   • Atorvastatin GI Intolerance and Myalgia   • Doxycycline Angioedema       Family History   Problem Relation Age of Onset   • Heart disease Father    • Hyperlipidemia Father    • Hypertension Brother        Social History     Tobacco Use   • Smoking status: Never Smoker   • Smokeless tobacco: Never Used   Substance Use Topics   • Alcohol use: No           Current Electrocardiogram:    ECG 12 Lead    Date/Time: 11/10/2021 1:05 PM  Performed by: Aaliyah Alvarado APRN  Authorized by: Aaliyah Alvarado APRN   Comparison: compared with previous ECG from 5/19/2021  Rhythm: sinus rhythm  BPM: 68                  EMR Dragon/Transcription:   \"Dictated utilizing Dragon dictation\".       "

## 2021-11-15 ENCOUNTER — TRANSCRIBE ORDERS (OUTPATIENT)
Dept: ADMINISTRATIVE | Facility: HOSPITAL | Age: 56
End: 2021-11-15

## 2021-11-15 DIAGNOSIS — Z12.31 ENCOUNTER FOR SCREENING MAMMOGRAM FOR MALIGNANT NEOPLASM OF BREAST: Primary | ICD-10-CM

## 2021-12-13 ENCOUNTER — HOSPITAL ENCOUNTER (OUTPATIENT)
Dept: MAMMOGRAPHY | Facility: HOSPITAL | Age: 56
Discharge: HOME | End: 2021-12-13
Admitting: NURSE PRACTITIONER

## 2021-12-13 DIAGNOSIS — Z12.31 ENCOUNTER FOR SCREENING MAMMOGRAM FOR MALIGNANT NEOPLASM OF BREAST: ICD-10-CM

## 2021-12-13 PROCEDURE — 77063 BREAST TOMOSYNTHESIS BI: CPT

## 2021-12-13 PROCEDURE — 77067 SCR MAMMO BI INCL CAD: CPT

## 2022-01-06 RX ORDER — EVOLOCUMAB 140 MG/ML
INJECTION, SOLUTION SUBCUTANEOUS
Qty: 1 ML | Refills: 2 | Status: SHIPPED | OUTPATIENT
Start: 2022-01-06 | End: 2022-03-14

## 2022-03-14 RX ORDER — EVOLOCUMAB 140 MG/ML
INJECTION, SOLUTION SUBCUTANEOUS
Qty: 2 PEN | Refills: 2 | Status: SHIPPED | OUTPATIENT
Start: 2022-03-14 | End: 2022-04-08

## 2022-04-08 RX ORDER — EVOLOCUMAB 140 MG/ML
INJECTION, SOLUTION SUBCUTANEOUS
Qty: 1 PEN | Refills: 3 | Status: SHIPPED | OUTPATIENT
Start: 2022-04-08 | End: 2022-09-08

## 2022-05-05 DIAGNOSIS — Z91.89 MULTIPLE RISK FACTORS FOR CORONARY ARTERY DISEASE: ICD-10-CM

## 2022-05-05 DIAGNOSIS — E78.1 PURE HYPERTRIGLYCERIDEMIA: ICD-10-CM

## 2022-05-05 RX ORDER — LISINOPRIL 10 MG/1
TABLET ORAL
Qty: 90 TABLET | Refills: 1 | Status: SHIPPED | OUTPATIENT
Start: 2022-05-05 | End: 2022-11-18

## 2022-05-05 RX ORDER — PROPRANOLOL HCL 60 MG
CAPSULE, EXTENDED RELEASE 24HR ORAL
Qty: 90 CAPSULE | Refills: 2 | Status: SHIPPED | OUTPATIENT
Start: 2022-05-05

## 2022-05-11 ENCOUNTER — OFFICE VISIT (OUTPATIENT)
Dept: CARDIOLOGY | Facility: CLINIC | Age: 57
End: 2022-05-11

## 2022-05-11 VITALS
BODY MASS INDEX: 38.45 KG/M2 | WEIGHT: 224 LBS | DIASTOLIC BLOOD PRESSURE: 74 MMHG | OXYGEN SATURATION: 95 % | HEART RATE: 70 BPM | SYSTOLIC BLOOD PRESSURE: 107 MMHG

## 2022-05-11 DIAGNOSIS — E11.9 TYPE 2 DIABETES MELLITUS WITHOUT COMPLICATION, WITHOUT LONG-TERM CURRENT USE OF INSULIN: ICD-10-CM

## 2022-05-11 DIAGNOSIS — E78.2 MIXED HYPERLIPIDEMIA: Primary | ICD-10-CM

## 2022-05-11 DIAGNOSIS — Z82.49 FAMILY HISTORY OF CORONARY ARTERY DISEASE IN FATHER: ICD-10-CM

## 2022-05-11 DIAGNOSIS — I10 ESSENTIAL HYPERTENSION: ICD-10-CM

## 2022-05-11 PROCEDURE — 93000 ELECTROCARDIOGRAM COMPLETE: CPT | Performed by: NURSE PRACTITIONER

## 2022-05-11 PROCEDURE — 99213 OFFICE O/P EST LOW 20 MIN: CPT | Performed by: NURSE PRACTITIONER

## 2022-05-11 NOTE — PROGRESS NOTES
[facility] CARDIOLOGY      REASON FOR FOLLOW-UP:  Hypertension  Dyslipidemia          Chief Complaint   Patient presents with   • Hypertension     6 month f/u   • Hyperlipidemia         Dear Tania Connell APRN        History of Present Illness   It was my pleasure to see Isidra in the office today.  As you are aware, she is a very pleasant 56-year-old  female with no known history of coronary artery disease.  She does have risk factors that include obesity, dyslipidemia, diabetes mellitus type 2, hypertension and paternal family history of heart disease.  She is intolerant to all statins secondary to myalgias.  Lipid panel  Patient reported her diabetes is well controlled with A1c consistently less than 6.  She was started on TriCor and continue over-the-counter omega-3's.  Recent 2D echo shows mild to moderate asymmetric hypertrophy with EF 60%, mild TR.  Left atrial cavity mildly dilated.. Lipids obtained 9/7/2021 again elevated with , triglycerides 417.  Patient was prescribed Repatha.  She presents today in follow-up for the above-mentioned diagnoses.    Today, Isidra reports that she feels well.  She specifically denies any complaints of chest pains, pressure, tightness.  She denies any shortness of breath at rest, dyspnea with exertion, orthopnea, PND or lower extremity edema.  No dizziness or lightheadedness.  Her blood pressure is under excellent control today at 107/74.      ASSESSMENT:  Dyslipidemia  Hypertension  Diabetes mellitus 2  Obesity  Intolerance to statins  Risk factors for coronary artery disease  Family history of coronary artery disease  Hypertensive cardiovascular disease        PLAN:  We will check lipids, BMP, CBC and A1c  Continue current CV plan of care  Continue to monitor fluid and sodium intake  Continue risk factor modification  I will notify patient of any lab abnormalities.  Follow-up in 6 months or sooner if needed        The  following portions of the patient's history were reviewed and updated as appropriate: allergies, current medications, past family history, past medical history, past social history, past surgical history and problem list.    REVIEW OF SYSTEMS:    Review of Systems   All other systems reviewed and are negative.      Vitals:    05/11/22 1551   BP: 107/74   Pulse: 70   SpO2: 95%         PHYSICAL EXAM:    General: Alert, cooperative, no distress, appears stated age  Head:  Normocephalic, atraumatic, mucous membranes moist  Eyes:  Conjunctiva/corneas clear, EOM's intact     Neck:  Supple,  no JVD or bruit     Lungs: Clear to auscultation bilaterally, no wheezes rhonchi rales are noted  Chest wall: No tenderness  Musculoskeletal:   Ambulates freely without assistance  Heart::  Regular rate and rhythm, S1 and S2 normal, no murmur, rub or gallop  Abdomen: Soft, non-tender, nondistended, bowel sounds active, no abdominal bruit  Extremities: No cyanosis, clubbing, or edema   Pulses: 2+ and symmetric all extremities  Skin:  No rashes or lesions  Neuro/psych: A&O x3. CN II through XII are grossly intact with appropriate affect        Past Medical History:   Diagnosis Date   • Diabetes mellitus (HCC)    • Hyperlipidemia        Past Surgical History:   Procedure Laterality Date   • BLADDER SURGERY     • HYSTERECTOMY           Current Outpatient Medications:   •  acetaminophen (TYLENOL) 500 MG tablet, Every Night., Disp: , Rfl:   •  Cholecalciferol (VITAMIN D3) 1.25 MG (72419 UT) capsule, VITAMIN D3 16488 UNIT CAPS, Disp: , Rfl:   •  clonazePAM (KlonoPIN) 0.25 MG disintegrating tablet, Place 0.25 mg on the tongue At Night As Needed., Disp: , Rfl:   •  fenofibrate (TRICOR) 48 MG tablet, TAKE 1 TABLET BY MOUTH DAILY., Disp: 90 tablet, Rfl: 1  •  fexofenadine (ALLEGRA) 180 MG tablet, As Needed., Disp: , Rfl:   •  Flaxseed, Linseed, (FLAXSEED OIL) 1000 MG capsule, Daily., Disp: , Rfl:   •  lisinopril (PRINIVIL,ZESTRIL) 10 MG tablet,  "TAKE 1 TABLET BY MOUTH EVERY DAY, Disp: 90 tablet, Rfl: 1  •  metFORMIN ER (GLUCOPHAGE-XR) 500 MG 24 hr tablet, , Disp: , Rfl:   •  Multiple Vitamin tablet, MULTIPLE VITAMIN TABS, Disp: , Rfl:   •  Omega 3 1200 MG capsule, Take  by mouth Daily., Disp: , Rfl:   •  pioglitazone (ACTOS) 30 MG tablet, , Disp: , Rfl:   •  propranolol LA (INDERAL LA) 60 MG 24 hr capsule, TAKE 1 CAPSULE BY MOUTH EVERY DAY, Disp: 90 capsule, Rfl: 2  •  Repatha SureClick solution auto-injector SureClick injection, INJECT 1 ML UNDER THE SKIN INTO THE APPROPRIATE AREA AS DIRECTED EVERY 14 (FOURTEEN) DAYS., Disp: 1 pen, Rfl: 3  •  vitamin B-12 (CYANOCOBALAMIN) 500 MCG tablet, Take 500 mcg by mouth Daily., Disp: , Rfl:   •  vitamin E 1000 UNIT capsule, Take 1,000 Units by mouth Daily., Disp: , Rfl:     Current Facility-Administered Medications:   •  Evolocumab (REPATHA) injection 140 mg, 140 mg, Subcutaneous, Q14 Days, Aaliyah Alvarado APRN    Allergies   Allergen Reactions   • Tetanus Toxoid Rash   • Atorvastatin GI Intolerance and Myalgia   • Doxycycline Angioedema       Family History   Problem Relation Age of Onset   • Heart disease Father    • Hyperlipidemia Father    • Hypertension Brother        Social History     Tobacco Use   • Smoking status: Never Smoker   • Smokeless tobacco: Never Used   Substance Use Topics   • Alcohol use: No           Current Electrocardiogram:    ECG 12 Lead    Date/Time: 5/11/2022 1:42 PM  Performed by: Aaliyah Alvarado APRN  Authorized by: Aaliyah Alvarado APRN   Comparison: not compared with previous ECG   Rhythm: sinus rhythm  BPM: 70                  EMR Dragon/Transcription:   \"Dictated utilizing Dragon dictation\".       "

## 2022-08-17 ENCOUNTER — TRANSCRIBE ORDERS (OUTPATIENT)
Dept: ADMINISTRATIVE | Facility: HOSPITAL | Age: 57
End: 2022-08-17

## 2022-08-17 ENCOUNTER — LAB (OUTPATIENT)
Dept: LAB | Facility: HOSPITAL | Age: 57
End: 2022-08-17

## 2022-08-17 DIAGNOSIS — E55.9 VITAMIN D DEFICIENCY, UNSPECIFIED: ICD-10-CM

## 2022-08-17 DIAGNOSIS — E78.5 HYPERLIPIDEMIA, UNSPECIFIED HYPERLIPIDEMIA TYPE: ICD-10-CM

## 2022-08-17 DIAGNOSIS — R53.81 MALAISE AND FATIGUE: ICD-10-CM

## 2022-08-17 DIAGNOSIS — E13.9 TYPE 1.5 DIABETES, MANAGED AS TYPE 2: Primary | ICD-10-CM

## 2022-08-17 DIAGNOSIS — R53.83 MALAISE AND FATIGUE: ICD-10-CM

## 2022-08-17 DIAGNOSIS — E13.9 TYPE 1.5 DIABETES, MANAGED AS TYPE 2: ICD-10-CM

## 2022-08-17 LAB
25(OH)D3 SERPL-MCNC: 66.6 NG/ML (ref 30–100)
ALBUMIN SERPL-MCNC: 4.3 G/DL (ref 3.5–5.2)
ALBUMIN/GLOB SERPL: 1.5 G/DL
ALP SERPL-CCNC: 82 U/L (ref 39–117)
ALT SERPL W P-5'-P-CCNC: 15 U/L (ref 1–33)
ANION GAP SERPL CALCULATED.3IONS-SCNC: 12 MMOL/L (ref 5–15)
AST SERPL-CCNC: 15 U/L (ref 1–32)
BASOPHILS # BLD AUTO: 0.02 10*3/MM3 (ref 0–0.2)
BASOPHILS NFR BLD AUTO: 0.2 % (ref 0–1.5)
BILIRUB SERPL-MCNC: 0.4 MG/DL (ref 0–1.2)
BUN SERPL-MCNC: 24 MG/DL (ref 6–20)
BUN/CREAT SERPL: 32 (ref 7–25)
CALCIUM SPEC-SCNC: 9.9 MG/DL (ref 8.6–10.5)
CHLORIDE SERPL-SCNC: 103 MMOL/L (ref 98–107)
CHOLEST SERPL-MCNC: 256 MG/DL (ref 0–200)
CO2 SERPL-SCNC: 23 MMOL/L (ref 22–29)
CREAT SERPL-MCNC: 0.75 MG/DL (ref 0.57–1)
DEPRECATED RDW RBC AUTO: 38.5 FL (ref 37–54)
EGFRCR SERPLBLD CKD-EPI 2021: 92.4 ML/MIN/1.73
EOSINOPHIL # BLD AUTO: 0.21 10*3/MM3 (ref 0–0.4)
EOSINOPHIL NFR BLD AUTO: 2.4 % (ref 0.3–6.2)
ERYTHROCYTE [DISTWIDTH] IN BLOOD BY AUTOMATED COUNT: 11.6 % (ref 12.3–15.4)
GLOBULIN UR ELPH-MCNC: 2.9 GM/DL
GLUCOSE SERPL-MCNC: 109 MG/DL (ref 65–99)
HCT VFR BLD AUTO: 40.4 % (ref 34–46.6)
HDLC SERPL-MCNC: 51 MG/DL (ref 40–60)
HGB BLD-MCNC: 14 G/DL (ref 12–15.9)
IMM GRANULOCYTES # BLD AUTO: 0.03 10*3/MM3 (ref 0–0.05)
IMM GRANULOCYTES NFR BLD AUTO: 0.3 % (ref 0–0.5)
LDLC SERPL CALC-MCNC: 155 MG/DL (ref 0–100)
LDLC/HDLC SERPL: 2.95 {RATIO}
LYMPHOCYTES # BLD AUTO: 3.16 10*3/MM3 (ref 0.7–3.1)
LYMPHOCYTES NFR BLD AUTO: 36.4 % (ref 19.6–45.3)
MCH RBC QN AUTO: 31.7 PG (ref 26.6–33)
MCHC RBC AUTO-ENTMCNC: 34.7 G/DL (ref 31.5–35.7)
MCV RBC AUTO: 91.4 FL (ref 79–97)
MONOCYTES # BLD AUTO: 0.67 10*3/MM3 (ref 0.1–0.9)
MONOCYTES NFR BLD AUTO: 7.7 % (ref 5–12)
NEUTROPHILS NFR BLD AUTO: 4.58 10*3/MM3 (ref 1.7–7)
NEUTROPHILS NFR BLD AUTO: 53 % (ref 42.7–76)
NRBC BLD AUTO-RTO: 0 /100 WBC (ref 0–0.2)
PLATELET # BLD AUTO: 265 10*3/MM3 (ref 140–450)
PMV BLD AUTO: 10.2 FL (ref 6–12)
POTASSIUM SERPL-SCNC: 4.3 MMOL/L (ref 3.5–5.2)
PROT SERPL-MCNC: 7.2 G/DL (ref 6–8.5)
RBC # BLD AUTO: 4.42 10*6/MM3 (ref 3.77–5.28)
SODIUM SERPL-SCNC: 138 MMOL/L (ref 136–145)
T4 FREE SERPL-MCNC: 1.17 NG/DL (ref 0.93–1.7)
TRIGL SERPL-MCNC: 272 MG/DL (ref 0–150)
TSH SERPL DL<=0.05 MIU/L-ACNC: 2.12 UIU/ML (ref 0.27–4.2)
VIT B12 BLD-MCNC: >2000 PG/ML (ref 211–946)
VLDLC SERPL-MCNC: 50 MG/DL (ref 5–40)
WBC NRBC COR # BLD: 8.67 10*3/MM3 (ref 3.4–10.8)

## 2022-08-17 PROCEDURE — 82306 VITAMIN D 25 HYDROXY: CPT

## 2022-08-17 PROCEDURE — 80050 GENERAL HEALTH PANEL: CPT

## 2022-08-17 PROCEDURE — 84439 ASSAY OF FREE THYROXINE: CPT

## 2022-08-17 PROCEDURE — 36415 COLL VENOUS BLD VENIPUNCTURE: CPT

## 2022-08-17 PROCEDURE — 80061 LIPID PANEL: CPT

## 2022-08-17 PROCEDURE — 82607 VITAMIN B-12: CPT

## 2022-09-08 RX ORDER — EVOLOCUMAB 140 MG/ML
INJECTION, SOLUTION SUBCUTANEOUS
Qty: 1 ML | Refills: 1 | Status: SHIPPED | OUTPATIENT
Start: 2022-09-08 | End: 2022-10-05

## 2022-10-05 RX ORDER — EVOLOCUMAB 140 MG/ML
INJECTION, SOLUTION SUBCUTANEOUS
Qty: 1 ML | Refills: 1 | Status: SHIPPED | OUTPATIENT
Start: 2022-10-05 | End: 2022-12-14 | Stop reason: SDUPTHER

## 2022-11-18 RX ORDER — LISINOPRIL 10 MG/1
TABLET ORAL
Qty: 90 TABLET | Refills: 1 | Status: SHIPPED | OUTPATIENT
Start: 2022-11-18

## 2022-11-23 ENCOUNTER — OFFICE VISIT (OUTPATIENT)
Dept: CARDIOLOGY | Facility: CLINIC | Age: 57
End: 2022-11-23

## 2022-11-23 ENCOUNTER — PREP FOR SURGERY (OUTPATIENT)
Dept: OTHER | Facility: HOSPITAL | Age: 57
End: 2022-11-23

## 2022-11-23 ENCOUNTER — PATIENT ROUNDING (BHMG ONLY) (OUTPATIENT)
Dept: CARDIOLOGY | Facility: CLINIC | Age: 57
End: 2022-11-23

## 2022-11-23 VITALS
DIASTOLIC BLOOD PRESSURE: 81 MMHG | BODY MASS INDEX: 38.62 KG/M2 | WEIGHT: 225 LBS | SYSTOLIC BLOOD PRESSURE: 115 MMHG | HEART RATE: 65 BPM | OXYGEN SATURATION: 97 %

## 2022-11-23 DIAGNOSIS — R68.89 ACTIVITY INTOLERANCE: ICD-10-CM

## 2022-11-23 DIAGNOSIS — E11.9 TYPE 2 DIABETES MELLITUS WITHOUT COMPLICATION, WITHOUT LONG-TERM CURRENT USE OF INSULIN: ICD-10-CM

## 2022-11-23 DIAGNOSIS — I10 ESSENTIAL HYPERTENSION: ICD-10-CM

## 2022-11-23 DIAGNOSIS — Z82.49 FAMILY HISTORY OF CORONARY ARTERY DISEASE IN FATHER: ICD-10-CM

## 2022-11-23 DIAGNOSIS — R06.09 DYSPNEA ON EXERTION: Primary | ICD-10-CM

## 2022-11-23 DIAGNOSIS — E78.2 MIXED HYPERLIPIDEMIA: ICD-10-CM

## 2022-11-23 PROCEDURE — 93000 ELECTROCARDIOGRAM COMPLETE: CPT | Performed by: NURSE PRACTITIONER

## 2022-11-23 PROCEDURE — 99214 OFFICE O/P EST MOD 30 MIN: CPT | Performed by: NURSE PRACTITIONER

## 2022-11-23 RX ORDER — ACETAMINOPHEN 500 MG
500 TABLET ORAL EVERY 4 HOURS PRN
Status: CANCELLED | OUTPATIENT
Start: 2022-11-23

## 2022-11-23 RX ORDER — NITROGLYCERIN 0.4 MG/1
0.4 TABLET SUBLINGUAL
Status: CANCELLED | OUTPATIENT
Start: 2022-11-23

## 2022-11-23 RX ORDER — ASPIRIN 81 MG/1
81 TABLET ORAL DAILY
Status: CANCELLED | OUTPATIENT
Start: 2022-11-24

## 2022-11-23 RX ORDER — SODIUM CHLORIDE 0.9 % (FLUSH) 0.9 %
3 SYRINGE (ML) INJECTION EVERY 12 HOURS SCHEDULED
Status: CANCELLED | OUTPATIENT
Start: 2022-11-23

## 2022-11-23 RX ORDER — ASPIRIN 81 MG/1
324 TABLET, CHEWABLE ORAL ONCE
Status: CANCELLED | OUTPATIENT
Start: 2022-11-23 | End: 2022-11-23

## 2022-11-23 RX ORDER — HYDROCODONE BITARTRATE AND ACETAMINOPHEN 5; 325 MG/1; MG/1
1 TABLET ORAL EVERY 4 HOURS PRN
Status: CANCELLED | OUTPATIENT
Start: 2022-11-23 | End: 2022-12-03

## 2022-11-23 RX ORDER — ONDANSETRON 2 MG/ML
4 INJECTION INTRAMUSCULAR; INTRAVENOUS EVERY 6 HOURS PRN
Status: CANCELLED | OUTPATIENT
Start: 2022-11-23

## 2022-11-23 RX ORDER — SODIUM CHLORIDE 0.9 % (FLUSH) 0.9 %
3-10 SYRINGE (ML) INJECTION AS NEEDED
Status: CANCELLED | OUTPATIENT
Start: 2022-11-23

## 2022-11-23 NOTE — PROGRESS NOTES
[facility] CARDI[facility]      REASON FOR FOLLOW-UP:  Hypertension  Dyslipidemia  Strong family history of coronary disease          Chief Complaint   Patient presents with   • Hypertension     6 month f/u   • Hyperlipidemia         Dear Tania Connell APRN        History of Present Illness   It was my pleasure to see Isidra in the office today.  As you are aware, she is a very pleasant 57-year-old  female with no known history of coronary occlusive disease.  She does have significant risk factors that include obesity, dyslipidemia, diabetes mellitus type 2, hypertension and strong paternal family history of heart disease.  She is intolerant to all statins secondary to myalgias.  She presents today in follow-up for the above-mentioned diagnoses.    Today, Isidra reports that she is having symptoms of decreased activity tolerance, dyspnea with minimal exertion and occasional dizziness.  She denies any actual near syncopal or syncopal episodes.  She becomes quite short of breath with minimal activity.  Symptom onset 3-4 months ago.  She denies any actual chest pain, pressure or tightness.  She does experience swelling to her lower extremities when standing for long periods that subsides with elevation.    The patient is intolerant to all statin therapies secondary to myalgias.  She is has been treated with subcutaneous Repatha for approximately 1 year with little improvement in LDLs.  Most recent labs from 8/17/2022: Total cholesterol 256 (288), triglycerides 272 (227),  (186).  EKG shows normal sinus rhythm with abnormal R wave progression      ASSESSMENT:  Dyslipidemia  Hypertension  Diabetes mellitus 2  Dyspnea with exertion  Poor activity tolerance  Lower extremity edema  Obesity  Intolerance to statins  Risk factors for coronary artery disease  Family history of coronary artery disease  Hypertensive cardiovascular disease    PLAN:  Given patient's symptoms and  multiple risk factors for coronary disease, I would recommend proceeding with heart catheterization to further risk stratify.  Patient wishes to proceed.        The following portions of the patient's history were reviewed and updated as appropriate: allergies, current medications, past family history, past medical history, past social history, past surgical history and problem list.    REVIEW OF SYSTEMS:    Review of Systems   Constitutional:        Poor activity tolerance   Cardiovascular: Positive for dyspnea on exertion and leg swelling. Negative for chest pain, orthopnea, palpitations, paroxysmal nocturnal dyspnea and syncope.   All other systems reviewed and are negative.      Vitals:    11/23/22 1019   BP: 115/81   Pulse: 65   SpO2: 97%         PHYSICAL EXAM:    General: Alert, cooperative, no distress, appears stated age  Head:  Normocephalic, atraumatic, mucous membranes moist  Eyes:  Conjunctiva/corneas clear, EOM's intact     Neck:  Supple,  no JVD or bruit     Lungs: Clear to auscultation bilaterally, no wheezes rhonchi rales are noted  Chest wall: No tenderness  Musculoskeletal:   Ambulates freely without assistance  Heart::  Regular rate and rhythm, S1 and S2 normal, no murmur, rub or gallop  Abdomen: Soft, non-tender, nondistended, bowel sounds active, no abdominal bruit  Extremities: No cyanosis, clubbing, or edema   Pulses: 2+ and symmetric all extremities  Skin:  No rashes or lesions  Neuro/psych: A&O x3. CN II through XII are grossly intact with appropriate affect        Past Medical History:   Diagnosis Date   • Diabetes mellitus (HCC)    • Hyperlipidemia        Past Surgical History:   Procedure Laterality Date   • BLADDER SURGERY     • HYSTERECTOMY           Current Outpatient Medications:   •  acetaminophen (TYLENOL) 500 MG tablet, Every Night., Disp: , Rfl:   •  Cholecalciferol (VITAMIN D3) 1.25 MG (24110 UT) capsule, VITAMIN D3 75905 UNIT CAPS, Disp: , Rfl:   •  clonazePAM (KlonoPIN) 0.5 MG  tablet, take 1 tablet by mouth daily as directed, Disp: 30 tablet, Rfl: 2  •  fenofibrate (TRICOR) 48 MG tablet, TAKE 1 TABLET BY MOUTH DAILY., Disp: 90 tablet, Rfl: 1  •  fexofenadine (ALLEGRA) 180 MG tablet, As Needed., Disp: , Rfl:   •  Flaxseed, Linseed, (FLAXSEED OIL) 1000 MG capsule, Daily., Disp: , Rfl:   •  lisinopril (PRINIVIL,ZESTRIL) 10 MG tablet, TAKE 1 TABLET BY MOUTH EVERY DAY, Disp: 90 tablet, Rfl: 1  •  metFORMIN ER (GLUCOPHAGE-XR) 500 MG 24 hr tablet, , Disp: , Rfl:   •  metFORMIN ER (GLUCOPHAGE-XR) 500 MG 24 hr tablet, TAKE 2 TABLETS BY MOUTH EVERY DAY, Disp: 180 tablet, Rfl: 0  •  Multiple Vitamin tablet, MULTIPLE VITAMIN TABS, Disp: , Rfl:   •  Omega 3 1200 MG capsule, Take  by mouth Daily., Disp: , Rfl:   •  pioglitazone (ACTOS) 30 MG tablet, , Disp: , Rfl:   •  propranolol LA (INDERAL LA) 60 MG 24 hr capsule, TAKE 1 CAPSULE BY MOUTH EVERY DAY, Disp: 90 capsule, Rfl: 2  •  Repatha SureClick solution auto-injector SureClick injection, INJECT 1 ML UNDER THE SKIN INTO THE APPROPRIATE AREA AS DIRECTED EVERY 14 (FOURTEEN) DAYS., Disp: 1 mL, Rfl: 1  •  vitamin E 1000 UNIT capsule, Take 1,000 Units by mouth Daily., Disp: , Rfl:   •  clonazePAM (KlonoPIN) 0.25 MG disintegrating tablet, Place 0.25 mg on the tongue At Night As Needed., Disp: , Rfl:   •  vitamin B-12 (CYANOCOBALAMIN) 500 MCG tablet, Take 500 mcg by mouth Daily., Disp: , Rfl:     Current Facility-Administered Medications:   •  Evolocumab (REPATHA) injection 140 mg, 140 mg, Subcutaneous, Q14 Days, Aaliyah Alvarado APRN    Allergies   Allergen Reactions   • Tetanus Toxoid Rash   • Atorvastatin GI Intolerance and Myalgia   • Doxycycline Angioedema       Family History   Problem Relation Age of Onset   • Heart disease Father    • Hyperlipidemia Father    • Hypertension Brother        Social History     Tobacco Use   • Smoking status: Never   • Smokeless tobacco: Never   Substance Use Topics   • Alcohol use: No           Current  "Electrocardiogram:    ECG 12 Lead    Date/Time: 11/23/2022 12:36 PM  Performed by: Aaliyah Alvarado APRN  Authorized by: Aaliyah Alvarado APRN   Comparison: compared with previous ECG from 5/12/2022  Similar to previous ECG  Rhythm: sinus rhythm  BPM: 65  Comments: Abnormal R wave progression                EMR Dragon/Transcription:   \"Dictated utilizing Dragon dictation\".       "

## 2022-11-23 NOTE — PROGRESS NOTES
November 23, 2022    Hello, may I speak with Isidra Gann?    My name is Anya    I am  with [facility]  [address] [place] [address] [place] [id number].    Before we get started may I verify your date of birth? 1965    I am calling to officially welcome you to our practice and ask about your recent visit. Is this a good time to talk?  Yes    Tell me about your visit with us. What things went well?  Visit was fine.  Holli said I needed to have a cath done.  Other than that it was good.     We're always looking for ways to make our patients' experiences even better. Do you have recommendations on ways we may improve?  No    Overall were you satisfied with your first visit to our practice? Yes    I appreciate you taking the time to speak with me today. Is there anything else I can do for you?  No    Thank you, and have a great day.

## 2022-12-07 DIAGNOSIS — E11.9 TYPE 2 DIABETES MELLITUS WITHOUT COMPLICATION, WITHOUT LONG-TERM CURRENT USE OF INSULIN: ICD-10-CM

## 2022-12-07 DIAGNOSIS — I10 ESSENTIAL HYPERTENSION: ICD-10-CM

## 2022-12-07 DIAGNOSIS — R68.89 ACTIVITY INTOLERANCE: ICD-10-CM

## 2022-12-07 DIAGNOSIS — Z82.49 FAMILY HISTORY OF CORONARY ARTERY DISEASE IN FATHER: ICD-10-CM

## 2022-12-07 DIAGNOSIS — E78.2 MIXED HYPERLIPIDEMIA: Primary | ICD-10-CM

## 2022-12-14 ENCOUNTER — HOSPITAL ENCOUNTER (OUTPATIENT)
Dept: NUCLEAR MEDICINE | Facility: HOSPITAL | Age: 57
Discharge: HOME | End: 2022-12-14

## 2022-12-14 DIAGNOSIS — Z82.49 FAMILY HISTORY OF CORONARY ARTERY DISEASE IN FATHER: ICD-10-CM

## 2022-12-14 DIAGNOSIS — I10 ESSENTIAL HYPERTENSION: ICD-10-CM

## 2022-12-14 DIAGNOSIS — R68.89 ACTIVITY INTOLERANCE: ICD-10-CM

## 2022-12-14 DIAGNOSIS — E11.9 TYPE 2 DIABETES MELLITUS WITHOUT COMPLICATION, WITHOUT LONG-TERM CURRENT USE OF INSULIN: ICD-10-CM

## 2022-12-14 DIAGNOSIS — E78.2 MIXED HYPERLIPIDEMIA: ICD-10-CM

## 2022-12-14 PROCEDURE — 93016 CV STRESS TEST SUPVJ ONLY: CPT | Performed by: NURSE PRACTITIONER

## 2022-12-14 PROCEDURE — 25010000002 REGADENOSON 0.4 MG/5ML SOLUTION: Performed by: NURSE PRACTITIONER

## 2022-12-14 PROCEDURE — A9502 TC99M TETROFOSMIN: HCPCS | Performed by: NURSE PRACTITIONER

## 2022-12-14 PROCEDURE — 93017 CV STRESS TEST TRACING ONLY: CPT

## 2022-12-14 PROCEDURE — 0 TECHNETIUM TETROFOSMIN KIT: Performed by: NURSE PRACTITIONER

## 2022-12-14 PROCEDURE — 78452 HT MUSCLE IMAGE SPECT MULT: CPT | Performed by: INTERNAL MEDICINE

## 2022-12-14 PROCEDURE — 93018 CV STRESS TEST I&R ONLY: CPT | Performed by: INTERNAL MEDICINE

## 2022-12-14 PROCEDURE — 78452 HT MUSCLE IMAGE SPECT MULT: CPT

## 2022-12-14 RX ORDER — EVOLOCUMAB 140 MG/ML
140 INJECTION, SOLUTION SUBCUTANEOUS
Qty: 1 ML | Refills: 10 | Status: SHIPPED | OUTPATIENT
Start: 2022-12-14 | End: 2023-01-23

## 2022-12-14 RX ADMIN — TETROFOSMIN 1 DOSE: 1.38 INJECTION, POWDER, LYOPHILIZED, FOR SOLUTION INTRAVENOUS at 12:45

## 2022-12-14 RX ADMIN — REGADENOSON 0.4 MG: 0.08 INJECTION, SOLUTION INTRAVENOUS at 13:40

## 2022-12-14 RX ADMIN — TETROFOSMIN 1 DOSE: 1.38 INJECTION, POWDER, LYOPHILIZED, FOR SOLUTION INTRAVENOUS at 13:40

## 2022-12-16 LAB
BH CV NUCLEAR PRIOR STUDY: 3
BH CV REST NUCLEAR ISOTOPE DOSE: 10.2 MCI
BH CV STRESS BP STAGE 1: NORMAL
BH CV STRESS BP STAGE 2: NORMAL
BH CV STRESS BP STAGE 3: NORMAL
BH CV STRESS COMMENTS STAGE 1: NORMAL
BH CV STRESS COMMENTS STAGE 2: NORMAL
BH CV STRESS DOSE REGADENOSON STAGE 1: 0.4
BH CV STRESS DURATION MIN STAGE 1: 0
BH CV STRESS DURATION MIN STAGE 2: 4
BH CV STRESS DURATION SEC STAGE 1: 10
BH CV STRESS DURATION SEC STAGE 2: 0
BH CV STRESS HR STAGE 1: 84
BH CV STRESS HR STAGE 2: 90
BH CV STRESS HR STAGE 3: 90
BH CV STRESS NUCLEAR ISOTOPE DOSE: 31.7 MCI
BH CV STRESS PROTOCOL 1: NORMAL
BH CV STRESS RECOVERY BP: NORMAL MMHG
BH CV STRESS RECOVERY HR: 85 BPM
BH CV STRESS STAGE 1: 1
BH CV STRESS STAGE 2: 2
BH CV STRESS STAGE 3: 3
LV EF NUC BP: 95 %
MAXIMAL PREDICTED HEART RATE: 161 BPM
PERCENT MAX PREDICTED HR: 55.9 %
STRESS BASELINE BP: NORMAL MMHG
STRESS BASELINE HR: 76 BPM
STRESS PERCENT HR: 66 %
STRESS POST PEAK BP: NORMAL MMHG
STRESS POST PEAK HR: 90 BPM
STRESS TARGET HR: 137 BPM

## 2023-01-23 RX ORDER — EVOLOCUMAB 140 MG/ML
140 INJECTION, SOLUTION SUBCUTANEOUS
Qty: 3 ML | Refills: 1 | Status: SHIPPED | OUTPATIENT
Start: 2023-01-23 | End: 2023-02-07 | Stop reason: SDUPTHER

## 2023-02-07 DIAGNOSIS — I10 ESSENTIAL HYPERTENSION: ICD-10-CM

## 2023-02-07 DIAGNOSIS — E78.2 MIXED HYPERLIPIDEMIA: Primary | ICD-10-CM

## 2023-02-07 DIAGNOSIS — Z82.49 FAMILY HISTORY OF CORONARY ARTERY DISEASE IN FATHER: ICD-10-CM

## 2023-02-07 RX ORDER — EVOLOCUMAB 140 MG/ML
140 INJECTION, SOLUTION SUBCUTANEOUS
Qty: 3 ML | Refills: 3 | Status: SHIPPED | OUTPATIENT
Start: 2023-02-07

## 2023-02-16 ENCOUNTER — CLINICAL SUPPORT (OUTPATIENT)
Dept: FAMILY MEDICINE CLINIC | Facility: CLINIC | Age: 58
End: 2023-02-16
Payer: COMMERCIAL

## 2023-02-16 DIAGNOSIS — R06.09 DYSPNEA ON EXERTION: ICD-10-CM

## 2023-02-16 DIAGNOSIS — I10 ESSENTIAL HYPERTENSION: ICD-10-CM

## 2023-02-16 DIAGNOSIS — Z82.49 FAMILY HISTORY OF CORONARY ARTERY DISEASE IN FATHER: ICD-10-CM

## 2023-02-16 DIAGNOSIS — E78.2 MIXED HYPERLIPIDEMIA: ICD-10-CM

## 2023-02-16 DIAGNOSIS — R68.89 ACTIVITY INTOLERANCE: ICD-10-CM

## 2023-02-16 DIAGNOSIS — E11.9 TYPE 2 DIABETES MELLITUS WITHOUT COMPLICATION, WITHOUT LONG-TERM CURRENT USE OF INSULIN: ICD-10-CM

## 2023-02-16 LAB
HBA1C MFR BLD: 6.4 % (ref 3.5–5.6)
INR PPP: 0.96 (ref 0.93–1.1)
PROTHROMBIN TIME: 9.9 SECONDS (ref 9.6–11.7)

## 2023-02-16 PROCEDURE — 36415 COLL VENOUS BLD VENIPUNCTURE: CPT | Performed by: REGISTERED NURSE

## 2023-02-16 PROCEDURE — 80061 LIPID PANEL: CPT | Performed by: NURSE PRACTITIONER

## 2023-02-16 PROCEDURE — 80048 BASIC METABOLIC PNL TOTAL CA: CPT | Performed by: NURSE PRACTITIONER

## 2023-02-16 PROCEDURE — 85027 COMPLETE CBC AUTOMATED: CPT | Performed by: NURSE PRACTITIONER

## 2023-02-16 PROCEDURE — 83036 HEMOGLOBIN GLYCOSYLATED A1C: CPT | Performed by: NURSE PRACTITIONER

## 2023-02-16 PROCEDURE — 85610 PROTHROMBIN TIME: CPT | Performed by: NURSE PRACTITIONER

## 2023-02-17 LAB
ANION GAP SERPL CALCULATED.3IONS-SCNC: 12 MMOL/L (ref 5–15)
BUN SERPL-MCNC: 19 MG/DL (ref 6–20)
BUN/CREAT SERPL: 27.9 (ref 7–25)
CALCIUM SPEC-SCNC: 9.7 MG/DL (ref 8.6–10.5)
CHLORIDE SERPL-SCNC: 101 MMOL/L (ref 98–107)
CHOLEST SERPL-MCNC: 255 MG/DL (ref 0–200)
CO2 SERPL-SCNC: 24 MMOL/L (ref 22–29)
CREAT SERPL-MCNC: 0.68 MG/DL (ref 0.57–1)
DEPRECATED RDW RBC AUTO: 41.1 FL (ref 37–54)
EGFRCR SERPLBLD CKD-EPI 2021: 100.5 ML/MIN/1.73
ERYTHROCYTE [DISTWIDTH] IN BLOOD BY AUTOMATED COUNT: 12 % (ref 12.3–15.4)
GLUCOSE SERPL-MCNC: 104 MG/DL (ref 65–99)
HCT VFR BLD AUTO: 41.3 % (ref 34–46.6)
HDLC SERPL-MCNC: 47 MG/DL (ref 40–60)
HGB BLD-MCNC: 13.7 G/DL (ref 12–15.9)
LDLC SERPL CALC-MCNC: 156 MG/DL (ref 0–100)
LDLC/HDLC SERPL: 3.23 {RATIO}
MCH RBC QN AUTO: 31.3 PG (ref 26.6–33)
MCHC RBC AUTO-ENTMCNC: 33.2 G/DL (ref 31.5–35.7)
MCV RBC AUTO: 94.3 FL (ref 79–97)
PLATELET # BLD AUTO: 257 10*3/MM3 (ref 140–450)
PMV BLD AUTO: 10.7 FL (ref 6–12)
POTASSIUM SERPL-SCNC: 4.2 MMOL/L (ref 3.5–5.2)
RBC # BLD AUTO: 4.38 10*6/MM3 (ref 3.77–5.28)
SODIUM SERPL-SCNC: 137 MMOL/L (ref 136–145)
TRIGL SERPL-MCNC: 282 MG/DL (ref 0–150)
VLDLC SERPL-MCNC: 52 MG/DL (ref 5–40)
WBC NRBC COR # BLD: 6.85 10*3/MM3 (ref 3.4–10.8)

## 2023-04-10 ENCOUNTER — OFFICE VISIT (OUTPATIENT)
Dept: CARDIOLOGY | Facility: CLINIC | Age: 58
End: 2023-04-10
Payer: COMMERCIAL

## 2023-04-10 VITALS
OXYGEN SATURATION: 96 % | SYSTOLIC BLOOD PRESSURE: 143 MMHG | HEIGHT: 64 IN | HEART RATE: 75 BPM | WEIGHT: 229 LBS | BODY MASS INDEX: 39.09 KG/M2 | DIASTOLIC BLOOD PRESSURE: 81 MMHG

## 2023-04-10 DIAGNOSIS — E78.2 MIXED HYPERLIPIDEMIA: Primary | ICD-10-CM

## 2023-04-10 DIAGNOSIS — E11.9 TYPE 2 DIABETES MELLITUS WITHOUT COMPLICATION, WITHOUT LONG-TERM CURRENT USE OF INSULIN: ICD-10-CM

## 2023-04-10 PROCEDURE — 99213 OFFICE O/P EST LOW 20 MIN: CPT | Performed by: NURSE PRACTITIONER

## 2023-04-10 RX ORDER — EZETIMIBE 10 MG/1
10 TABLET ORAL DAILY
Qty: 90 TABLET | Refills: 3 | Status: SHIPPED | OUTPATIENT
Start: 2023-04-10

## 2023-04-10 NOTE — PROGRESS NOTES
[facility] CARDIOLOGY      REASON FOR FOLLOW-UP:  Dyslipidemia  Primary hypertension          Chief Complaint   Patient presents with   • Heart Problem         Dear Tania Connell APRN        History of Present Illness   It was my pleasure to see Isidra in the office today.  She is a 57-year-old female with no known history of coronary occlusive disease.  She does have significant risk factors that include obesity, dyslipidemia, diabetes mellitus type 2, hypertension and strong paternal family history of heart disease.  She is intolerant to all statins secondary to myalgias.  The patient is currently on Repatha 140 mg subcutaneous q. 14 d.  She presents today in follow-up for lipids.    Nuclear stress testing performed 12/16/2022 showed normal myocardial perfusion study with no evidence of ischemia, low risk study.  TTE 6/16/2020: EF 60%, mild TR, mild-moderate asymmetric hypertrophy.    Today, the patient reports no symptoms of chest pain, pressure or tightness.  She denies any shortness of breath out of character for her.  No near syncopal or syncopal episodes.  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  She is chronically tired due to night shift work and daytime responsibilities.    Lipids reviewed from 2/16/2023 with no significant improvement: Total cholesterol 255 (256), triglycerides 282 (272),  (155).  Hemoglobin A1c 6.4      ASSESSMENT:  Dyslipidemia  Primary hypertension  Diabetes mellitus 2    PLAN:  Continue Repatha, will add Zetia 10 mg daily.  Recheck lipids in 3 months.  Continue risk factor modification including heart healthy diet and exercise  Follow-up in 6 months or sooner if needed      Diagnoses and all orders for this visit:    1. Mixed hyperlipidemia (Primary)  -     Lipid Panel; Future    2. Type 2 diabetes mellitus without complication, without long-term  current use of insulin  -     Lipid Panel; Future    Other orders  -     ezetimibe (ZETIA) 10 MG tablet; Take 1 tablet by mouth Daily.  Dispense: 90 tablet; Refill: 3          The following portions of the patient's history were reviewed and updated as appropriate: allergies, current medications, past family history, past medical history, past social history, past surgical history and problem list.    REVIEW OF SYSTEMS:    Review of Systems   Constitutional: Positive for malaise/fatigue.   All other systems reviewed and are negative.      Vitals:    04/10/23 1115   BP: 143/81   Pulse: 75   SpO2: 96%         PHYSICAL EXAM:    General: Alert, cooperative, no distress, appears stated age  Head:  Normocephalic, atraumatic, mucous membranes moist  Eyes:  Conjunctiva/corneas clear, EOM's intact     Neck:  Supple,  no JVD or bruit     Lungs: Clear to auscultation bilaterally, no wheezes rhonchi rales are noted  Chest wall: No tenderness  Musculoskeletal:   Ambulates freely without assistance  Heart::  Regular rate and rhythm, S1 and S2 normal, no murmur, rub or gallop  Abdomen: Soft, non-tender, nondistended, bowel sounds active, no abdominal bruit  Extremities: No cyanosis, clubbing, or edema   Pulses: 2+ and symmetric all extremities  Skin:  No rashes or lesions  Neuro/psych: A&O x3. CN II through XII are grossly intact with appropriate affect        Past Medical History:   Diagnosis Date   • Diabetes mellitus    • Hyperlipidemia        Past Surgical History:   Procedure Laterality Date   • BLADDER SURGERY     • HYSTERECTOMY           Current Outpatient Medications:   •  acetaminophen (TYLENOL) 500 MG tablet, Every Night., Disp: , Rfl:   •  Cholecalciferol (VITAMIN D3) 1.25 MG (86738 UT) capsule, VITAMIN D3 22585 UNIT CAPS, Disp: , Rfl:   •  clonazePAM (KlonoPIN) 0.5 MG tablet, Take 1 tablet by mouth Daily., Disp: 30 tablet, Rfl: 1  •  escitalopram (LEXAPRO) 5 MG tablet, Take 1 tablet by mouth Daily., Disp: 30 tablet,  "Rfl: 2  •  Evolocumab (Repatha SureClick) solution auto-injector SureClick injection, Inject 1 mL under the skin into the appropriate area as directed Every 14 (Fourteen) Days., Disp: 3 mL, Rfl: 3  •  fexofenadine (ALLEGRA) 180 MG tablet, As Needed., Disp: , Rfl:   •  Flaxseed, Linseed, (FLAXSEED OIL) 1000 MG capsule, Daily., Disp: , Rfl:   •  lisinopril (PRINIVIL,ZESTRIL) 10 MG tablet, TAKE 1 TABLET BY MOUTH EVERY DAY, Disp: 90 tablet, Rfl: 1  •  metFORMIN ER (GLUCOPHAGE-XR) 500 MG 24 hr tablet, TAKE 2 TABLETS BY MOUTH EVERY DAY, Disp: 180 tablet, Rfl: 0  •  Multiple Vitamin tablet, MULTIPLE VITAMIN TABS, Disp: , Rfl:   •  Omega 3 1200 MG capsule, Take  by mouth Daily., Disp: , Rfl:   •  pioglitazone (ACTOS) 30 MG tablet, , Disp: , Rfl:   •  propranolol LA (INDERAL LA) 60 MG 24 hr capsule, TAKE 1 CAPSULE BY MOUTH EVERY DAY, Disp: 90 capsule, Rfl: 2  •  vitamin E 1000 UNIT capsule, Take 1 capsule by mouth Daily., Disp: , Rfl:   •  ezetimibe (ZETIA) 10 MG tablet, Take 1 tablet by mouth Daily., Disp: 90 tablet, Rfl: 3    Allergies   Allergen Reactions   • Tetanus Toxoid Rash   • Atorvastatin GI Intolerance and Myalgia   • Doxycycline Angioedema       Family History   Problem Relation Age of Onset   • Heart disease Father    • Hyperlipidemia Father    • Hypertension Brother        Social History     Tobacco Use   • Smoking status: Never   • Smokeless tobacco: Never   Substance Use Topics   • Alcohol use: No           Current Electrocardiogram:  Procedures        EMR Dragon/Transcription:   \"Dictated utilizing Dragon dictation\".       "

## 2023-10-09 ENCOUNTER — LAB (OUTPATIENT)
Dept: LAB | Facility: HOSPITAL | Age: 58
End: 2023-10-09
Payer: COMMERCIAL

## 2023-10-09 DIAGNOSIS — I10 ESSENTIAL HYPERTENSION: ICD-10-CM

## 2023-10-09 DIAGNOSIS — E11.9 TYPE 2 DIABETES MELLITUS WITHOUT COMPLICATION, WITHOUT LONG-TERM CURRENT USE OF INSULIN: ICD-10-CM

## 2023-10-09 DIAGNOSIS — R68.89 ACTIVITY INTOLERANCE: ICD-10-CM

## 2023-10-09 DIAGNOSIS — E78.2 MIXED HYPERLIPIDEMIA: ICD-10-CM

## 2023-10-09 DIAGNOSIS — Z82.49 FAMILY HISTORY OF CORONARY ARTERY DISEASE IN FATHER: ICD-10-CM

## 2023-10-09 DIAGNOSIS — R06.09 DYSPNEA ON EXERTION: ICD-10-CM

## 2023-10-09 LAB
ANION GAP SERPL CALCULATED.3IONS-SCNC: 10.1 MMOL/L (ref 5–15)
BASOPHILS # BLD AUTO: 0.03 10*3/MM3 (ref 0–0.2)
BASOPHILS NFR BLD AUTO: 0.4 % (ref 0–1.5)
BUN SERPL-MCNC: 14 MG/DL (ref 8–23)
BUN/CREAT SERPL: 20.9 (ref 7–25)
CALCIUM SPEC-SCNC: 9.8 MG/DL (ref 8.6–10.5)
CHLORIDE SERPL-SCNC: 105 MMOL/L (ref 98–107)
CHOLEST SERPL-MCNC: 283 MG/DL (ref 0–200)
CO2 SERPL-SCNC: 23.9 MMOL/L (ref 22–29)
CREAT SERPL-MCNC: 0.67 MG/DL (ref 0.57–1)
DEPRECATED RDW RBC AUTO: 40.7 FL (ref 37–54)
EGFRCR SERPLBLD CKD-EPI 2021: 100.2 ML/MIN/1.73
EOSINOPHIL # BLD AUTO: 0.19 10*3/MM3 (ref 0–0.4)
EOSINOPHIL NFR BLD AUTO: 2.4 % (ref 0.3–6.2)
ERYTHROCYTE [DISTWIDTH] IN BLOOD BY AUTOMATED COUNT: 12.1 % (ref 12.3–15.4)
GLUCOSE SERPL-MCNC: 134 MG/DL (ref 65–99)
HCT VFR BLD AUTO: 39.8 % (ref 34–46.6)
HDLC SERPL-MCNC: 46 MG/DL (ref 40–60)
HGB BLD-MCNC: 13.8 G/DL (ref 12–15.9)
IMM GRANULOCYTES # BLD AUTO: 0.05 10*3/MM3 (ref 0–0.05)
IMM GRANULOCYTES NFR BLD AUTO: 0.6 % (ref 0–0.5)
LDLC SERPL CALC-MCNC: 170 MG/DL (ref 0–100)
LDLC/HDLC SERPL: 3.63 {RATIO}
LYMPHOCYTES # BLD AUTO: 2.3 10*3/MM3 (ref 0.7–3.1)
LYMPHOCYTES NFR BLD AUTO: 28.9 % (ref 19.6–45.3)
MCH RBC QN AUTO: 32.2 PG (ref 26.6–33)
MCHC RBC AUTO-ENTMCNC: 34.7 G/DL (ref 31.5–35.7)
MCV RBC AUTO: 93 FL (ref 79–97)
MONOCYTES # BLD AUTO: 0.87 10*3/MM3 (ref 0.1–0.9)
MONOCYTES NFR BLD AUTO: 10.9 % (ref 5–12)
NEUTROPHILS NFR BLD AUTO: 4.51 10*3/MM3 (ref 1.7–7)
NEUTROPHILS NFR BLD AUTO: 56.8 % (ref 42.7–76)
NRBC BLD AUTO-RTO: 0 /100 WBC (ref 0–0.2)
PLATELET # BLD AUTO: 244 10*3/MM3 (ref 140–450)
PMV BLD AUTO: 10.8 FL (ref 6–12)
POTASSIUM SERPL-SCNC: 4.4 MMOL/L (ref 3.5–5.2)
RBC # BLD AUTO: 4.28 10*6/MM3 (ref 3.77–5.28)
SODIUM SERPL-SCNC: 139 MMOL/L (ref 136–145)
TRIGL SERPL-MCNC: 349 MG/DL (ref 0–150)
VLDLC SERPL-MCNC: 67 MG/DL (ref 5–40)
WBC NRBC COR # BLD: 7.95 10*3/MM3 (ref 3.4–10.8)

## 2023-10-09 PROCEDURE — 36415 COLL VENOUS BLD VENIPUNCTURE: CPT

## 2023-10-09 PROCEDURE — 80048 BASIC METABOLIC PNL TOTAL CA: CPT

## 2023-10-09 PROCEDURE — 85025 COMPLETE CBC W/AUTO DIFF WBC: CPT

## 2023-10-09 PROCEDURE — 80061 LIPID PANEL: CPT

## 2023-10-18 RX ORDER — LISINOPRIL 10 MG/1
10 TABLET ORAL DAILY
Qty: 90 TABLET | Refills: 0 | Status: SHIPPED | OUTPATIENT
Start: 2023-10-18

## 2023-10-30 ENCOUNTER — CONSULT (OUTPATIENT)
Dept: CARDIOLOGY | Facility: CLINIC | Age: 58
End: 2023-10-30
Payer: COMMERCIAL

## 2023-10-30 VITALS
BODY MASS INDEX: 39.61 KG/M2 | WEIGHT: 232 LBS | HEART RATE: 73 BPM | DIASTOLIC BLOOD PRESSURE: 78 MMHG | OXYGEN SATURATION: 96 % | SYSTOLIC BLOOD PRESSURE: 121 MMHG | HEIGHT: 64 IN

## 2023-10-30 DIAGNOSIS — G47.33 OSA (OBSTRUCTIVE SLEEP APNEA): ICD-10-CM

## 2023-10-30 DIAGNOSIS — E78.49 FAMILIAL HYPERLIPIDEMIA, HIGH LDL: Primary | ICD-10-CM

## 2023-10-30 DIAGNOSIS — E78.1 PURE HYPERTRIGLYCERIDEMIA: ICD-10-CM

## 2023-10-30 DIAGNOSIS — E11.9 TYPE 2 DIABETES MELLITUS WITHOUT COMPLICATION, WITHOUT LONG-TERM CURRENT USE OF INSULIN: ICD-10-CM

## 2023-10-30 DIAGNOSIS — Z91.89 MULTIPLE RISK FACTORS FOR CORONARY ARTERY DISEASE: ICD-10-CM

## 2023-10-30 DIAGNOSIS — E78.2 MIXED HYPERLIPIDEMIA: ICD-10-CM

## 2023-10-30 PROCEDURE — 93000 ELECTROCARDIOGRAM COMPLETE: CPT | Performed by: INTERNAL MEDICINE

## 2023-10-30 PROCEDURE — 99214 OFFICE O/P EST MOD 30 MIN: CPT | Performed by: INTERNAL MEDICINE

## 2023-10-30 RX ORDER — ROSUVASTATIN CALCIUM 10 MG/1
10 TABLET, COATED ORAL DAILY
Qty: 90 TABLET | Refills: 3 | Status: SHIPPED | OUTPATIENT
Start: 2023-10-30

## 2023-10-30 RX ORDER — SEMAGLUTIDE 0.68 MG/ML
INJECTION, SOLUTION SUBCUTANEOUS
COMMUNITY
Start: 2023-10-10

## 2023-10-30 RX ORDER — ALBUTEROL SULFATE 90 UG/1
AEROSOL, METERED RESPIRATORY (INHALATION)
COMMUNITY
Start: 2023-09-27

## 2023-10-30 RX ORDER — IPRATROPIUM BROMIDE 42 UG/1
SPRAY, METERED NASAL
COMMUNITY
Start: 2023-07-10 | End: 2023-10-30

## 2023-10-30 NOTE — PROGRESS NOTES
Cardiology Office Visit      Encounter Date:  10/30/2023    Patient ID:   Isidra Gann is a 58 y.o. female.    Reason For Followup:  Fatigue  Hyperlipidemia    Brief Clinical History:  Dear Tania Brumfield APRN    I had the pleasure of seeing Isidra Gann today. As you are well aware, this is a 58 y.o. female with no known history of coronary occlusive disease.  She does have significant risk factors that include obesity, dyslipidemia, diabetes mellitus type 2, hypertension and strong paternal family history of heart disease.  She is intolerant to all statins secondary to myalgias.  The patient is currently on Repatha 140 mg subcutaneous q. 14 d.  She presents today in follow-up for lipids.     Nuclear stress testing performed 12/16/2022 showed normal myocardial perfusion study with no evidence of ischemia, low risk study.  TTE 6/16/2020: EF 60%, mild TR, mild-moderate asymmetric hypertrophy.     Today, the patient reports no symptoms of chest pain, pressure or tightness.  She denies any shortness of breath out of character for her.  No near syncopal or syncopal episodes.  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  She is chronically tired due to night shift work and daytime responsibilities.     Lipids reviewed from 2/16/2023 with no significant improvement: Total cholesterol 255 (256), triglycerides 282 (272),  (155).  Hemoglobin A1c 6.4              Interval History:  Complaining of significant fatigue being tired  Some shortness of breath with exertion and activity  Chest discomfort  No syncope no orthopnea no PND      Assessment & Plan    Impressions:    Familial hyperlipidemia with elevated LDL cholesterol  Hypertension  Beatties mellitus type II  Fatigue  Obesity/Body mass index is 39.82 kg/m².     Recommendations:  Recommend vascular screening and calcium score for further risk  stratification  Prior stress test with no inducible ischemia  Recommend sleep study to further evaluate for possible underlying obstructive sleep apnea contributing to patient fatigue  Patient was recently started on Ozempic  Need for regular exercise and weight loss and risk factor modification reviewed and discussed with patient  Intolerant to Zetia  History of myalgia with statin therapy  Continue Repatha for hyper cholesterolemia  We will add Crestor at 5 mg p.o. nightly and eventually increased to 10 mg p.o. once a day as long as patient tolerates without any significant myalgia  Check lipids and CPK level in 2 months  Recommend vascular screening and calcium score for further risk stratification  Continued aggressive risk factor modification close monitoring and follow-up  Follow-up in office in 3 months          Lab Results   Component Value Date    GLUCOSE 134 (H) 10/09/2023    BUN 14 10/09/2023    CREATININE 0.67 10/09/2023    EGFR 100.2 10/09/2023    BCR 20.9 10/09/2023    K 4.4 10/09/2023    CO2 23.9 10/09/2023    CALCIUM 9.8 10/09/2023    ALBUMIN 4.30 08/17/2022    BILITOT 0.4 08/17/2022    AST 15 08/17/2022    ALT 15 08/17/2022     Results for orders placed in visit on 06/11/20    Adult Transthoracic Echo Complete W/ Cont if Necessary Per Protocol    Interpretation Summary  · Left ventricular wall thickness is consistent with mild to moderate asymmetric hypertrophy.  · Estimated EF = 60%.  · Left ventricular systolic function is normal.  · Left atrial cavity size is mildly dilated.  · Mild tricuspid valve regurgitation is present.     No results found for this or any previous visit.     Lab Results   Component Value Date    CHOL 283 (H) 10/09/2023    TRIG 349 (H) 10/09/2023    HDL 46 10/09/2023     (H) 10/09/2023      Results for orders placed during the hospital encounter of 12/14/22    Stress Test With Myocardial Perfusion One Day    Interpretation Summary    Myocardial perfusion imaging  "indicates a normal myocardial perfusion study with no evidence of ischemia.    Left ventricular ejection fraction is hyperdynamic (Calculated EF > 70%).    Impressions are consistent with a low risk study.    There is no prior study available for comparison.    Findings consistent with a normal ECG stress test.            Objective:    Vitals:  Vitals:    10/30/23 0844   BP: 121/78   BP Location: Left arm   Patient Position: Sitting   Pulse: 73   SpO2: 96%   Weight: 105 kg (232 lb)   Height: 162.6 cm (64\")       Physical Exam:    General: Alert, cooperative, no distress, appears stated age  Head:  Normocephalic, atraumatic, mucous membranes moist  Eyes:  Conjunctiva/corneas clear, EOM's intact     Neck:  Supple,  no adenopathy;      Lungs: Clear to auscultation bilaterally, no wheezes rhonchi rales are noted  Chest wall: No tenderness  Heart::  Regular rate and rhythm, S1 and S2 normal, no murmur, rub or gallop  Abdomen: Soft, non-tender, nondistended bowel sounds active  Extremities: No cyanosis, clubbing, or edema  Pulses: 2+ and symmetric all extremities  Skin:  No rashes or lesions  Neuro/psych: A&O x3. CN II through XII are grossly intact with appropriate affect      Allergies:  Allergies   Allergen Reactions    Tetanus Toxoid Rash    Atorvastatin GI Intolerance and Myalgia    Doxycycline Angioedema       Medication Review:     Current Outpatient Medications:     acetaminophen (TYLENOL) 500 MG tablet, Every Night., Disp: , Rfl:     albuterol sulfate  (90 Base) MCG/ACT inhaler, USE AS DIRECTED, INHALE 2 PUFFS BY MOUTH EVERY 4 TO 6 HOURS AS NEEDED FOR COUGH OR SHORTNESS OF AIR, Disp: , Rfl:     Cholecalciferol (VITAMIN D3) 1.25 MG (95280 UT) capsule, VITAMIN D3 04719 UNIT CAPS, Disp: , Rfl:     clonazePAM (KlonoPIN) 0.5 MG tablet, Take 1 tablet by mouth Daily., Disp: 30 tablet, Rfl: 1    escitalopram (LEXAPRO) 5 MG tablet, Take 1 tablet by mouth Daily., Disp: 30 tablet, Rfl: 2    Evolocumab (Repatha " SureClick) solution auto-injector SureClick injection, Inject 1 mL under the skin into the appropriate area as directed Every 14 (Fourteen) Days., Disp: 3 mL, Rfl: 11    fexofenadine (ALLEGRA) 180 MG tablet, As Needed., Disp: , Rfl:     Flaxseed, Linseed, (FLAXSEED OIL) 1000 MG capsule, Daily., Disp: , Rfl:     lisinopril (PRINIVIL,ZESTRIL) 10 MG tablet, Take 1 tablet by mouth Daily., Disp: 90 tablet, Rfl: 0    metFORMIN ER (GLUCOPHAGE-XR) 500 MG 24 hr tablet, TAKE 2 TABLETS BY MOUTH EVERY DAY, Disp: 180 tablet, Rfl: 0    Multiple Vitamin tablet, MULTIPLE VITAMIN TABS, Disp: , Rfl:     Omega 3 1200 MG capsule, Take  by mouth Daily., Disp: , Rfl:     Ozempic, 0.25 or 0.5 MG/DOSE, 2 MG/3ML solution pen-injector, AS DIRECTED, 0.25 MG INJECTED WEEKLY, Disp: , Rfl:     propranolol LA (INDERAL LA) 60 MG 24 hr capsule, TAKE 1 CAPSULE BY MOUTH EVERY DAY, Disp: 90 capsule, Rfl: 2    vitamin E 1000 UNIT capsule, Take 1 capsule by mouth Daily., Disp: , Rfl:     rosuvastatin (CRESTOR) 10 MG tablet, Take 1 tablet by mouth Daily., Disp: 90 tablet, Rfl: 3    Family History:  Family History   Problem Relation Age of Onset    Heart disease Father     Hyperlipidemia Father     Hypertension Brother        Past Medical History:  Past Medical History:   Diagnosis Date    Diabetes mellitus     Hyperlipidemia        Past surgical History:  Past Surgical History:   Procedure Laterality Date    BLADDER SURGERY      HYSTERECTOMY         Social History:  Social History     Socioeconomic History    Marital status:    Tobacco Use    Smoking status: Former     Types: Cigarettes     Quit date:      Years since quittin.8    Smokeless tobacco: Never   Vaping Use    Vaping Use: Never used   Substance and Sexual Activity    Alcohol use: No    Drug use: No    Sexual activity: Defer       Review of Systems:  The following systems were reviewed as they relate to the cardiovascular system: Constitutional, Eyes, ENT, Cardiovascular,  Respiratory, Gastrointestinal, Integumentary, Neurological, Psychiatric, Hematologic, Endocrine, Musculoskeletal, and Genitourinary. The pertinent cardiovascular findings are reported above with all other cardiovascular points within those systems being negative.    Diagnostic Study Review:     Current Electrocardiogram:    ECG 12 Lead    Date/Time: 10/30/2023 10:11 AM  Performed by: Elen Daniel MD    Authorized by: Elen Daniel MD  Comparison: compared with previous ECG   Similar to previous ECG  Rhythm: sinus rhythm  Rate: normal  BPM: 72  Conduction: conduction normal  ST Segments: ST segments normal  T Waves: T waves normal  QRS axis: normal    Clinical impression: normal ECG                  NOTE: The following portions of the patient's history were reviewed and updated this visit as appropriate: allergies, current medications, past family history, past medical history, past social history, past surgical history and problem list.   Answers submitted by the patient for this visit:  Other (Submitted on 10/23/2023)  Please describe your symptoms.: Elavated cholesterol  Have you had these symptoms before?: Yes  How long have you been having these symptoms?: Greater than 2 weeks  Please list any medications you are currently taking for this condition.: Repatha injections and omega 3 capsules  Primary Reason for Visit (Submitted on 10/23/2023)  What is the primary reason for your visit?: Other

## 2023-11-06 ENCOUNTER — APPOINTMENT (OUTPATIENT)
Dept: GENERAL RADIOLOGY | Facility: HOSPITAL | Age: 58
End: 2023-11-06
Payer: COMMERCIAL

## 2023-11-06 ENCOUNTER — HOSPITAL ENCOUNTER (EMERGENCY)
Facility: HOSPITAL | Age: 58
Discharge: HOME | End: 2023-11-06
Admitting: EMERGENCY MEDICINE
Payer: COMMERCIAL

## 2023-11-06 VITALS
OXYGEN SATURATION: 96 % | TEMPERATURE: 97.7 F | HEIGHT: 63 IN | WEIGHT: 228.84 LBS | BODY MASS INDEX: 40.55 KG/M2 | RESPIRATION RATE: 18 BRPM | HEART RATE: 72 BPM | SYSTOLIC BLOOD PRESSURE: 145 MMHG | DIASTOLIC BLOOD PRESSURE: 86 MMHG

## 2023-11-06 DIAGNOSIS — S61.451A CAT BITE OF HAND, RIGHT, INITIAL ENCOUNTER: Primary | ICD-10-CM

## 2023-11-06 DIAGNOSIS — S61.051A: ICD-10-CM
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
## 2023-11-06 PROCEDURE — 25010000002 LIDOCAINE 1 % SOLUTION

## 2023-11-06 PROCEDURE — 87077 CULTURE AEROBIC IDENTIFY: CPT

## 2023-11-06 PROCEDURE — 87070 CULTURE OTHR SPECIMN AEROBIC: CPT

## 2023-11-06 PROCEDURE — 25010000002 BUPIVACAINE (PF) 0.5 % SOLUTION

## 2023-11-06 PROCEDURE — 99283 EMERGENCY DEPT VISIT LOW MDM: CPT

## 2023-11-06 PROCEDURE — 87205 SMEAR GRAM STAIN: CPT

## 2023-11-06 PROCEDURE — 73130 X-RAY EXAM OF HAND: CPT

## 2023-11-06 RX ORDER — AMOXICILLIN AND CLAVULANATE POTASSIUM 875; 125 MG/1; MG/1
1 TABLET, FILM COATED ORAL 2 TIMES DAILY
Qty: 14 TABLET | Refills: 0 | Status: SHIPPED | OUTPATIENT
Start: 2023-11-06 | End: 2023-11-13

## 2023-11-06 RX ORDER — LIDOCAINE HYDROCHLORIDE 10 MG/ML
10 INJECTION, SOLUTION INFILTRATION; PERINEURAL ONCE
Status: COMPLETED | OUTPATIENT
Start: 2023-11-06 | End: 2023-11-06

## 2023-11-06 RX ORDER — BUPIVACAINE HYDROCHLORIDE 5 MG/ML
10 INJECTION, SOLUTION EPIDURAL; INTRACAUDAL ONCE
Status: COMPLETED | OUTPATIENT
Start: 2023-11-06 | End: 2023-11-06

## 2023-11-06 RX ORDER — IBUPROFEN 600 MG/1
600 TABLET ORAL ONCE
Status: COMPLETED | OUTPATIENT
Start: 2023-11-06 | End: 2023-11-06

## 2023-11-06 RX ORDER — DIAPER,BRIEF,INFANT-TODD,DISP
1 EACH MISCELLANEOUS EVERY 12 HOURS SCHEDULED
Status: DISCONTINUED | OUTPATIENT
Start: 2023-11-06 | End: 2023-11-06 | Stop reason: HOSPADM

## 2023-11-06 RX ADMIN — BUPIVACAINE HYDROCHLORIDE 10 ML: 5 INJECTION, SOLUTION EPIDURAL; INTRACAUDAL; PERINEURAL at 12:25

## 2023-11-06 RX ADMIN — IBUPROFEN 600 MG: 600 TABLET, FILM COATED ORAL at 12:53

## 2023-11-06 RX ADMIN — LIDOCAINE HYDROCHLORIDE 10 ML: 10 INJECTION, SOLUTION INFILTRATION; PERINEURAL at 12:25

## 2023-11-06 RX ADMIN — BACITRACIN 0.9 G: 500 OINTMENT TOPICAL at 12:54

## 2023-11-06 NOTE — Clinical Note
[facility] EMERGENCY DEPARTMENT  [address] [place] [address]  Phone: [phone]    Isidra Gann was seen and treated in our emergency department on 11/6/2023.  She may return to work on 11/06/2023.         Thank you for choosing [facility].    Jessa Dumont PA-C

## 2023-11-06 NOTE — DISCHARGE INSTRUCTIONS
Augmentin 875 mg twice daily for 7 days.  May take over-the-counter NSAIDs as needed for pain and swelling.    Follow-up with primary care physician as needed.    Return to the ED for any worsening symptoms or if new symptoms develop.

## 2023-11-07 NOTE — ED PROVIDER NOTES
Subjective   History of Present Illness  58-year-old female scented to the ED with chief complaint of redness and swelling to right thumb onset Saturday.  She states that she got a new foster cat who was startled by patient's dog and scratched her and bit her on her right thumb.  Patient states as she just got the cat she is unsure of vaccination status.  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  She states that she is up-to-date on her tetanus shot.  Denies seeking care prior to today.  Reports right thumb is tender and swollen and has been draining.  Denies any relief or peroxides soaks or Silvadene cream.  Denies any fever or chills.         Review of Systems   Constitutional:  Negative for chills and fever.   Skin:  Positive for wound.   All other systems reviewed and are negative.      Past Medical History:   Diagnosis Date    Diabetes mellitus     Hyperlipidemia        Allergies   Allergen Reactions    Tetanus Toxoid Rash    Atorvastatin GI Intolerance and Myalgia    Doxycycline Angioedema       Past Surgical History:   Procedure Laterality Date    BLADDER SURGERY      HYSTERECTOMY         Family History   Problem Relation Age of Onset    Heart disease Father     Hyperlipidemia Father     Hypertension Brother        Social History     Socioeconomic History    Marital status:    Tobacco Use    Smoking status: Former     Types: Cigarettes     Quit date:      Years since quittin.8    Smokeless tobacco: Never   Vaping Use    Vaping Use: Never used   Substance and Sexual Activity    Alcohol use: No    Drug use: No    Sexual activity: Defer           Objective   Physical Exam  Vitals and nursing note reviewed.   Constitutional:       General: She is not in acute distress.     Appearance: Normal appearance. She is not ill-appearing, toxic-appearing or diaphoretic.   HENT:      Head: Normocephalic.      Nose: No congestion or rhinorrhea.   Eyes:      General: No  scleral icterus.     Extraocular Movements: Extraocular movements intact.      Conjunctiva/sclera: Conjunctivae normal.      Pupils: Pupils are equal, round, and reactive to light.   Cardiovascular:      Rate and Rhythm: Normal rate and regular rhythm.      Heart sounds: Normal heart sounds. No murmur heard.     No gallop.   Pulmonary:      Effort: Pulmonary effort is normal. No respiratory distress.      Breath sounds: Normal breath sounds. No stridor. No wheezing, rhonchi or rales.   Chest:      Chest wall: No tenderness.   Musculoskeletal:         General: Normal range of motion.        Hands:       Cervical back: Normal range of motion and neck supple.      Comments: R Thumb with erythema and < 2 cm area of edema to palmar aspect.  No active drainage on exam.  Area of fluctuance noted to tip of thumb.  Swelling also noted at base of thumb into palmar aspect. dorsal side of right hand with 3 small scratches with no active bleeding, no drainage.   Skin:     General: Skin is warm.   Neurological:      General: No focal deficit present.      Mental Status: She is alert and oriented to person, place, and time. Mental status is at baseline.   Psychiatric:         Mood and Affect: Mood normal.         Behavior: Behavior normal.         Thought Content: Thought content normal.         Judgment: Judgment normal.         Incision & Drainage    Date/Time: 11/6/2023 10:40 PM    Performed by: Jessa Dumont PA-C  Authorized by: Jessa Dumont PA-C    Consent:     Consent obtained:  Verbal    Consent given by:  Patient    Risks, benefits, and alternatives were discussed: yes      Risks discussed:  Bleeding, incomplete drainage, pain and infection  Universal protocol:     Procedure explained and questions answered to patient or proxy's satisfaction: yes      Relevant documents present and verified: yes      Site/side marked: yes      Immediately prior to procedure, a time out was called: yes      Patient identity  "confirmed:  Verbally with patient and arm band  Location:     Type:  Abscess    Size:  <2cm    Location:  Upper extremity    Upper extremity location:  Hand    Hand location:  R hand  Pre-procedure details:     Skin preparation:  Chlorhexidine with alcohol  Anesthesia:     Anesthesia method:  Nerve block    Block needle gauge:  24 G    Block anesthetic:  Bupivacaine 0.5% w/o epi and lidocaine 1% w/o epi    Block technique:  Digit block    Block injection procedure:  Anatomic landmarks identified and introduced needle    Block outcome:  Anesthesia achieved  Procedure type:     Complexity:  Simple  Procedure details:     Needle aspiration: yes      Needle size:  18 G    Incision types:  Stab incision    Incision depth:  Subcutaneous    Drainage:  Purulent and bloody    Drainage amount:  Moderate    Wound treatment:  Wound left open    Packing materials:  None  Post-procedure details:     Procedure completion:  Tolerated well, no immediate complications  Comments:      Bacitracin and band-aid were applied.              ED Course      /86   Pulse 72   Temp 97.7 °F (36.5 °C) (Oral)   Resp 18   Ht 160 cm (63\")   Wt 104 kg (228 lb 13.4 oz)   SpO2 96%   BMI 40.54 kg/m²   Labs Reviewed   WOUND CULTURE     Medications   bupivacaine (PF) (MARCAINE) 0.5 % injection 10 mL (10 mL Injection Given 11/6/23 1225)   lidocaine (XYLOCAINE) 1 % injection 10 mL (10 mL Injection Given 11/6/23 1225)   ibuprofen (ADVIL,MOTRIN) tablet 600 mg (600 mg Oral Given 11/6/23 1253)     XR Hand 3+ View Right    Result Date: 11/6/2023  Impression: 1. No acute osseous abnormality. 2. No radiodense foreign body. Electronically Signed: Noel Stephens MD  11/6/2023 11:39 AM EST  Workstation ID: [id number]                                        Medical Decision Making  Appropriate PPE worn during exam.    /86   Pulse 72   Temp 97.7 °F (36.5 °C) (Oral)   Resp 18   Ht 160 cm (63\")   Wt 104 kg (228 lb 13.4 oz)   SpO2 96%   BMI 40.54 kg/m² "      Co-morbidities --  has a past medical history of Diabetes mellitus and Hyperlipidemia.  Radiology interpretation --  X-rays reviewed by me and interpreted by radiologist:  XR Hand 3+ View Right    Result Date: 11/6/2023  Impression: 1. No acute osseous abnormality. 2. No radiodense foreign body. Electronically Signed: Noel Stephens MD  11/6/2023 11:39 AM EST  Workstation ID: [id number]    Plan ---year-old female presented to the ED with edema and redness of right thumb after obtaining a cat bite by her own foster cat.  She states she is unsure of cats vaccination status as it is a foster.  Reports she is up-to-date on her tetanus.X-ray was obtained and showed no acute osseous abnormality as well is no retained foreign body.  It was prepped and draped in a sterile fashion, digit block was performed using 0.5% bupivacaine and 1% lidocaine without epi.  Some purulent drainage was expressed and wound culture was performed.  Patient was given ibuprofen for pain and swelling.  She was prescribed Augmentin 2 times a day for 7 days.  Remained stable while in the ED and was discharged home.    I discussed the findings and recommendations with the patient who voices understanding. Stable while in the ER.           Problems Addressed:  Bite wound of right thumb, initial encounter: complicated acute illness or injury  Cat bite of hand, right, initial encounter: complicated acute illness or injury    Amount and/or Complexity of Data Reviewed  Labs: ordered.  Radiology: ordered.    Risk  Prescription drug management.        Final diagnoses:   Cat bite of hand, right, initial encounter   Bite wound of right thumb, initial encounter       ED Disposition  ED Disposition       ED Disposition   Discharge    Condition   Stable    Comment   --               Tania Connell, APRN  [address] [facility] [place] [address]  [phone]    Call in 3 days  If symptoms worsen         Medication List        New Prescriptions       amoxicillin-clavulanate 875-125 MG per tablet  Commonly known as: AUGMENTIN  Take 1 tablet by mouth 2 (Two) Times a Day for 7 days.               Where to Get Your Medications        These medications were sent to [address] [place] [address]      Hours: Monday to Friday 7 AM to 7 PM Phone: [phone]   amoxicillin-clavulanate 875-125 MG per tablet            Jessa Dumont PA-C  11/06/23 6312

## 2023-11-09 LAB
BACTERIA SPEC AEROBE CULT: ABNORMAL
BACTERIA SPEC AEROBE CULT: ABNORMAL
GRAM STN SPEC: ABNORMAL

## 2023-11-09 NOTE — PROGRESS NOTES
Patient presented to the ED on 11/6 for redness, swelling, and draining of thumb since 11/4 due to a cat bite. I&D was performed within the department. Patient wound culture resulted with  Pasteurella multocida . Susceptible to Penicillins. Patient was given Rx for amoxicillin-clavulanic acid. Therapy is appropriate coverage. No further follow-up required.    Microbiology Results (last 10 days)       Procedure Component Value - Date/Time    Wound Culture - Swab, Hand, Digit Right [[id number]]  (Abnormal) Collected: 11/06/23 1225    Lab Status: Final result Specimen: Swab from Hand, Digit Right Updated: 11/09/23 1328     Wound Culture Scant growth (1+) Pasteurella multocida     Comment: Pasteurella species are susceptible to penicillin, cephalosporins (except oral agents), tetracycline, quinolones, trimethoprim-sulfamethoxazole.         Rare Normal Skin Criss     Gram Stain No organisms seen            Macey Roblero PharmD  11/9/2023 17:46 EST

## 2023-11-10 ENCOUNTER — TRANSCRIBE ORDERS (OUTPATIENT)
Dept: ADMINISTRATIVE | Facility: HOSPITAL | Age: 58
End: 2023-11-10
Payer: COMMERCIAL

## 2023-11-10 DIAGNOSIS — Z13.6 SCREENING FOR CARDIOVASCULAR, RESPIRATORY, AND GENITOURINARY DISEASES: Primary | ICD-10-CM

## 2023-11-10 DIAGNOSIS — Z13.6 ENCOUNTER FOR SCREENING FOR VASCULAR DISEASE: ICD-10-CM

## 2023-11-10 DIAGNOSIS — Z13.83 SCREENING FOR CARDIOVASCULAR, RESPIRATORY, AND GENITOURINARY DISEASES: Primary | ICD-10-CM

## 2023-11-10 DIAGNOSIS — Z13.89 SCREENING FOR CARDIOVASCULAR, RESPIRATORY, AND GENITOURINARY DISEASES: Primary | ICD-10-CM

## 2023-11-29 ENCOUNTER — OFFICE VISIT (OUTPATIENT)
Dept: SLEEP MEDICINE | Facility: CLINIC | Age: 58
End: 2023-11-29
Payer: COMMERCIAL

## 2023-11-29 VITALS
WEIGHT: 228 LBS | BODY MASS INDEX: 40.4 KG/M2 | HEART RATE: 70 BPM | OXYGEN SATURATION: 96 % | HEIGHT: 63 IN | DIASTOLIC BLOOD PRESSURE: 78 MMHG | SYSTOLIC BLOOD PRESSURE: 135 MMHG

## 2023-11-29 DIAGNOSIS — G47.19 EXCESSIVE DAYTIME SLEEPINESS: ICD-10-CM

## 2023-11-29 DIAGNOSIS — Z72.821 INADEQUATE SLEEP HYGIENE: ICD-10-CM

## 2023-11-29 DIAGNOSIS — R06.83 SNORING: ICD-10-CM

## 2023-11-29 DIAGNOSIS — G43.809 OTHER MIGRAINE WITHOUT STATUS MIGRAINOSUS, NOT INTRACTABLE: ICD-10-CM

## 2023-11-29 DIAGNOSIS — I10 ESSENTIAL HYPERTENSION: ICD-10-CM

## 2023-11-29 DIAGNOSIS — R29.818 SUSPECTED SLEEP APNEA: Primary | ICD-10-CM

## 2023-11-29 PROCEDURE — G0463 HOSPITAL OUTPT CLINIC VISIT: HCPCS

## 2023-11-29 NOTE — PROGRESS NOTES
[facility] [address] [address]  [place]   [place] [address]  Phone: [phone]  Fax: [phone]      Isidra MARKS Gideonhugokayce  [id number]   1965  58 y.o.  female      Referring physician/provider and PCP Tania Connell APRN    Type of service: Initial Sleep Medicine Consult.  Date of service: 11/29/2023      Chief Complaint   Patient presents with    Daytime Sleepiness    Snoring       History of present illness;  The patient was seen today on 11/29/2023 at [facility].    Thank you for asking to see Isidra Gann, 58 y.o. PMHx of HTN, Anxiety (on klonopin 0.5 mg/d), NIDDM, migraines (On propanolol 60 mg bed time by PCP).  The patient presents for initial evaluation of sleep sleep disordered breathing.  Patient endorses in 1973 prior surgery namely tonsillectomy, and nasal surgery in 2023 but denies UPPP.     -States possible snoring no current bed partner  +excessive daytime sleepiness inconsistent with normal epworth 8/24 also endorses fatigue  Non-restorative sleep  Never had sleep study    Obstructive Sleep Apnea Screening: STOP-BANG Sleep Apnea Questionnaire. Reference: Amaya F et al. Br J Anaesth, 2012.     Criterion    Yes    No  Do you SNORE loudly?   [x]   Yes  []   No   Do you often feel TIRED, fatigued, or sleepy during the day?    [x]   Yes  []   No  Has anyone OBSERVED you stop breathing during your sleep?    []   Yes  [x]   No  Do you have or are you being treated for high blood PRESSURE?    [x]   Yes  []   No  BMI >32 kg/m2     [x]   Yes  []   No  AGE > 50 years    [x]   Yes  []   No  NECK circumference >16 inches / 40 cm    [x]   Yes  []   No  GENDER: male     []   Yes  [x]   No    WILMAR Probability:  []   1-2 - Low  []   3-4 - Intermediate  [x]   5-8 - High      -Takes propanolol 60 mg at night time for migraines which are well controlled   States nights where she doesn't take it she has less difficulty falling and staying asleep          Further Sleep  History:    Bedtime: 930-1130pm   Rise Time: 6-7am  Sleep Latency: 30  - 60 min - reads in bed, plays games, lays in bed  Screens in bed: yes  Wake after sleep onset: 3x  Reasons for awakenings: her pets will wake her up dogs, cats   Number of naps per day: One nap per day 30 - 60 minutes    Naps restorative: No   Caffeine use: 1 coffee per day    RLS Symptoms: No   Bruxism:No   Current sleep related gastroesophageal reflux symptoms:  No   Cataplexy:  No   Sleep Paralysis:  No   Hypnagogic or hypnopompic hallucinations: No   Parasomnias such as sleep walking or sleep eating No     Disclaimer Sleep History: The above sleep history is based on this sleep physician's in room encounter with the patient. Pre encounter self administered questionnaires are taken into consideration and discussed with patient for any discordance. The above documentation by this sleep physician is the most accurate clinical information determined by in room sleep physician encounter with patient.     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 Pre encounter self administered questionnaires are taken into consideration and discussed with patient for any discordance. The above documentation by this sleep physician is the most accurate clinical information determined by in room sleep physician encounter with patient.     Physical exam:  Vitals:    11/29/23 0900   BP: 135/78   BP Location: Right arm   Patient  "Position: Sitting   Pulse: 70   SpO2: 96%   Weight: 103 kg (228 lb)   Height: 160 cm (63\")    Body mass index is 40.39 kg/m².   CONSTITUTIONAL:  Non-toxic, In no overt distress   Head: normocephalic   ENT: Mallampati class IV, + macroglossia, no septal defects   NECK:Neck Circumference: 18 inches,no nuchal rigidity  RESPIRATORY SYSTEM: Breath sounds are clear (no rales, no rhonchi, no wheezes), no accessory muscle use  CARDIOVASULAR SYSTEM: Heart sounds are regular rhythm and normal rate, no rub, no gallop, no edema  NEUROLOGICAL SYSTEM: Oriented x 3, No gross focal deficits   PSYCHIATRIC SYSTEM: Goal oriented, affect full range appropriate      Office notes from care team reviewed:    -10/30/2023 Office Visit Cardiology Dr. Elen Daniel    Labs reviewed.     Most Recent A1C          2/16/2023    10:16   HGBA1C Most Recent   Hemoglobin A1C 6.4       -10/9/2023   Bicarb 23.9  H&H 13.8/39.8  MCV 93      Imaging/Diagnostics reviewed:     -6/12/2020 Echo TTE Cardiologist's Interpretation Summary  · Left ventricular wall thickness is consistent with mild to moderate asymmetric hypertrophy.  · Estimated EF = 60%.  · Left ventricular systolic function is normal.  · Left atrial cavity size is mildly dilated.  · Mild tricuspid valve regurgitation is present.     -12/14/22 Stress Test Cardiologist's Interpretation Summary       ·  Myocardial perfusion imaging indicates a normal myocardial perfusion study with no evidence of ischemia.  ·  Left ventricular ejection fraction is hyperdynamic (Calculated EF > 70%).  ·  Impressions are consistent with a low risk study.  ·  There is no prior study available for comparison.  ·  Findings consistent with a normal ECG stress test.       Assessment and plan:  Suspected sleep apnea [R29.818] patient's symptoms and examination is consistent with sleep apnea (G47.30). I have talked to the patient about the signs and symptoms of sleep apnea. In addition, I have also discussed " pathophysiology of sleep apnea.  I also discussed the complications of untreated sleep apnea including effects on hypertension, diabetes mellitus and nonrestorative sleep with hypersomnia which can increase risk for motor vehicle accidents.  Untreated sleep apnea is also a risk factor for development of atrial fibrillation, hypertension, insulin resistance and cerebrovascular accident.  Discussed in detail of various testing methods including home-based and lab based sleep studies.  Based on history and physical examination and other comorbidities the most appropriate study is Home Sleep Study.  The order for the sleep study is placed in [facility].  The test will be scheduled after approval from insurance. Treatment and management will be discussed after the test is completed.  High pretest probability STOP-BANG 6/8, macroglossia, Mallampati is IV.  Home sleep study may rule in sleep apnea.  However, under patient's specific clinical circumstances home sleep study may not rule out sleep apnea.  If home sleep testing is negative must proceed with in laboratory polysomnography to definitively rule out sleep apnea (the prior was discussed with patient). Patient was given opportunity to ask questions and all the questions were answered.   Snoring (R06.83), snoring is the sound created by turbulent airflow vibrating upper airway soft tissue due to limitation of inspiratory airflow. I have also discussed factors affecting snoring including sleep deprivation, sleeping on the back and alcohol ingestion. To minimize snoring, patient is advised to have adequate sleep, sleep on the side and avoid alcohol and sedative medications before bedtime  Excessive daytime sleepiness .  Patient endorses subjective excessive daytime sleepiness with sleep physician encounter which was inconsistent with patient's pre-encounter self-administered Sanger Sleepiness Scale of Total score: 8.  There are many causes for daytime excessive sleepiness  including sleep depression, shiftwork syndrome, depression and other medical disorders including heart, kidney and liver failure.  The most serious cause of excessive sleepiness is due to neurological conditions like narcolepsy/cataplexy.  But the most common cause of excessive sleepiness is due to sleep apnea with frequent awakenings during sleep time.  I have discussed safety of driving and to remain vigilant while driving.  Inadequate sleep hygiene [Z72.821] Multiple inadequate circumstances of sleep preclude a diagnosis of insomnia. Patient specific barriers: day time naps, pets waking her up (counseled keep out of bedroom), screens in bed. Counseled lifestyle modifications as below to be applied especially night of any sleep study, verbalized understanding of same. List of 20 healthy sleep practices provided to patient. Follow up with PCP to reinforce my counseling towards healthy lifestyle modifications if no sleep apnea.   Obesity, patient's BMI is Body mass index is 40.39 kg/m².. I have discussed the relationship between weight and sleep apnea.There is direct correlation between weight and severity of sleep apnea.  Weight reduction is encouraged, as it is going to reduce the severity of sleep apnea. I have also discussed with the patient diet and exercise to achieve ideal body weight.  Migraines,  Propanolol 60 mg qhs will contribute to sleep disturbance. I counseled her to follow up with PCP who is prescribing physician to discuss risks/benefits/alternatives.  HTN, Follow up with primary care physician for continued management. This medical condition would make the patient eligible for a trial of PAP therapy even if sleep study reveals mild severity sleep apnea.      I have also discussed with the patient the following  Sleep hygiene: Maintaining a regular bedtime and wake time, not to watch television or work in bed, limit caffeine-containing beverages before bed time and avoid naps during the  day  Adequate amount of sleep.  Generally most people needs about 7 to 8 hours of sleep.      Return for 31 to 90 days after PAP setup with down load.  Patient's questions were answered      I once again thank you for asking me to see this patient in consultation and I have forwarded my opinion and treatment plan.  Please do not hesitate to call me if you have any questions.       EMR Dragon/Transcription disclaimer:   Much of this encounter note is an electronic transcription/translation of spoken language to printed text. The electronic translation of spoken language may permit erroneous, or at times, nonsensical words or phrases to be inadvertently transcribed; Although I have reviewed the note for such errors, some may still exist.     NPI #: [id number]    Naif Campoverde, DO  Sleep Medicine  [facility]  11/29/23

## 2023-12-05 ENCOUNTER — HOSPITAL ENCOUNTER (OUTPATIENT)
Dept: CT IMAGING | Facility: HOSPITAL | Age: 58
Discharge: HOME | End: 2023-12-05

## 2023-12-05 ENCOUNTER — HOSPITAL ENCOUNTER (OUTPATIENT)
Dept: ULTRASOUND IMAGING | Facility: HOSPITAL | Age: 58
Discharge: HOME | End: 2023-12-05

## 2023-12-05 DIAGNOSIS — Z13.83 SCREENING FOR CARDIOVASCULAR, RESPIRATORY, AND GENITOURINARY DISEASES: ICD-10-CM

## 2023-12-05 DIAGNOSIS — Z13.89 SCREENING FOR CARDIOVASCULAR, RESPIRATORY, AND GENITOURINARY DISEASES: ICD-10-CM

## 2023-12-05 DIAGNOSIS — Z13.6 ENCOUNTER FOR SCREENING FOR VASCULAR DISEASE: ICD-10-CM

## 2023-12-05 DIAGNOSIS — Z13.6 SCREENING FOR CARDIOVASCULAR, RESPIRATORY, AND GENITOURINARY DISEASES: ICD-10-CM

## 2023-12-05 PROCEDURE — 93799 UNLISTED CV SVC/PROCEDURE: CPT

## 2023-12-05 PROCEDURE — 75571 CT HRT W/O DYE W/CA TEST: CPT

## 2023-12-07 LAB
BH CV VAS SCREENING CAROTID CCA LEFT: 110.3 CM/SEC
BH CV VAS SCREENING CAROTID CCA RIGHT: 105.3 CM/SEC
BH CV VAS SCREENING CAROTID ICA LEFT: 80.1 CM/SEC
BH CV VAS SCREENING CAROTID ICA RIGHT: 75.6 CM/SEC
BH CV XLRA MEAS - MID AO DIAM: 1.5 CM
BH CV XLRA MEAS - PAD LEFT ABI PT: 1
BH CV XLRA MEAS - PAD LEFT ARM: 115 MMHG
BH CV XLRA MEAS - PAD LEFT LEG PT: 124 MMHG
BH CV XLRA MEAS - PAD RIGHT ABI PT: 1
BH CV XLRA MEAS - PAD RIGHT ARM: 119 MMHG
BH CV XLRA MEAS - PAD RIGHT LEG PT: 120 MMHG
BH CV XLRA MEAS LEFT ICA/CCA RATIO: 0.7
BH CV XLRA MEAS RIGHT ICA/CCA RATIO: 0.7

## 2023-12-20 RX ORDER — LISINOPRIL 10 MG/1
10 TABLET ORAL DAILY
Qty: 90 TABLET | Refills: 0 | Status: SHIPPED | OUTPATIENT
Start: 2023-12-20

## 2024-02-26 ENCOUNTER — LAB (OUTPATIENT)
Dept: LAB | Facility: HOSPITAL | Age: 59
End: 2024-02-26
Payer: COMMERCIAL

## 2024-02-26 DIAGNOSIS — E78.1 PURE HYPERTRIGLYCERIDEMIA: ICD-10-CM

## 2024-02-26 DIAGNOSIS — Z91.89 MULTIPLE RISK FACTORS FOR CORONARY ARTERY DISEASE: ICD-10-CM

## 2024-02-26 DIAGNOSIS — E78.2 MIXED HYPERLIPIDEMIA: ICD-10-CM

## 2024-02-26 DIAGNOSIS — E11.9 TYPE 2 DIABETES MELLITUS WITHOUT COMPLICATION, WITHOUT LONG-TERM CURRENT USE OF INSULIN: ICD-10-CM

## 2024-02-26 LAB
CHOLEST SERPL-MCNC: 200 MG/DL (ref 0–200)
CK SERPL-CCNC: 57 U/L (ref 20–180)
HDLC SERPL-MCNC: 40 MG/DL (ref 40–60)
LDLC SERPL CALC-MCNC: 106 MG/DL (ref 0–100)
LDLC/HDLC SERPL: 2.42 {RATIO}
TRIGL SERPL-MCNC: 317 MG/DL (ref 0–150)
VLDLC SERPL-MCNC: 54 MG/DL (ref 5–40)

## 2024-02-26 PROCEDURE — 36415 COLL VENOUS BLD VENIPUNCTURE: CPT

## 2024-02-26 PROCEDURE — 82550 ASSAY OF CK (CPK): CPT

## 2024-02-26 PROCEDURE — 80061 LIPID PANEL: CPT

## 2024-03-06 ENCOUNTER — OFFICE VISIT (OUTPATIENT)
Dept: CARDIOLOGY | Facility: CLINIC | Age: 59
End: 2024-03-06
Payer: COMMERCIAL

## 2024-03-06 VITALS
OXYGEN SATURATION: 97 % | DIASTOLIC BLOOD PRESSURE: 86 MMHG | HEIGHT: 63 IN | BODY MASS INDEX: 39.69 KG/M2 | HEART RATE: 75 BPM | SYSTOLIC BLOOD PRESSURE: 138 MMHG | WEIGHT: 224 LBS

## 2024-03-06 DIAGNOSIS — I10 ESSENTIAL HYPERTENSION: ICD-10-CM

## 2024-03-06 DIAGNOSIS — R06.09 DYSPNEA ON EXERTION: ICD-10-CM

## 2024-03-06 DIAGNOSIS — E11.9 TYPE 2 DIABETES MELLITUS WITHOUT COMPLICATION, WITHOUT LONG-TERM CURRENT USE OF INSULIN: ICD-10-CM

## 2024-03-06 DIAGNOSIS — E66.01 SEVERE OBESITY (BMI 35.0-39.9) WITH COMORBIDITY: ICD-10-CM

## 2024-03-06 DIAGNOSIS — E78.2 MIXED HYPERLIPIDEMIA: Primary | ICD-10-CM

## 2024-03-06 NOTE — PROGRESS NOTES
Cardiology Office Visit      Encounter Date:  03/06/2024    Patient ID:   Isidra Gann is a 58 y.o. female.    Reason For Followup:  Fatigue  Hyperlipidemia    Brief Clinical History:  Dear Tania Brumfield APRN    I had the pleasure of seeing Isidra Gann today. As you are well aware, this is a 58 y.o. female with no known history of coronary occlusive disease.  She does have significant risk factors that include obesity, dyslipidemia, diabetes mellitus type 2, hypertension and strong paternal family history of heart disease.  She is intolerant to all statins secondary to myalgias.  The patient is currently on Repatha 140 mg subcutaneous q. 14 d.  She presents today in follow-up for lipids.     Nuclear stress testing performed 12/16/2022 showed normal myocardial perfusion study with no evidence of ischemia, low risk study.  TTE 6/16/2020: EF 60%, mild TR, mild-moderate asymmetric hypertrophy.     Today, the patient reports no symptoms of chest pain, pressure or tightness.  She denies any shortness of breath out of character for her.  No near syncopal or syncopal episodes.  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  She is chronically tired due to night shift work and daytime responsibilities.     Lipids reviewed from 2/16/2023 with no significant improvement: Total cholesterol 255 (256), triglycerides 282 (272),  (155).  Hemoglobin A1c 6.4              Interval History:    Complaining of some fatigue  Denies any exertional chest pain  Shortness of breath somewhat better  No syncope no orthopnea no PND      Assessment & Plan    Impressions:    Familial hyperlipidemia with elevated LDL cholesterol  Hypertension  Beatties mellitus type II  Fatigue  Obesity/Body mass index is 39.82 kg/m².   Patient is tolerating low-dose statin but still LDL is not at goal she was on a PCSK9 inhibitor but the insurance  "company does not want to pay for PCSK9 inhibitor secondary to patient calcium score being 0  Calcium score is 0  Normal screening vascular ultrasound study  Recommendations:  Recommend vascular screening and calcium score for further risk stratification  Prior stress test with no inducible ischemia  Recommend sleep study to further evaluate for possible underlying obstructive sleep apnea contributing to patient fatigue  Patient was recently started on Ozempic  Need for regular exercise and weight loss and risk factor modification reviewed and discussed with patient  Intolerant to Zetia  History of myalgia with statin therapy  Repatha coverage was denied by the pharmacy  Currently tolerating Crestor at 10 mg p.o. once a day but LDL is not at goal  Results of vascular screening and calcium score reviewed and discussed with the patient  Continued aggressive risk factor modification close monitoring and follow-up  Follow-up in office in 6 months        Vitals:  Vitals:    03/06/24 1121   BP: 138/86   BP Location: Left arm   Pulse: 75   SpO2: 97%   Weight: 102 kg (224 lb)   Height: 160 cm (63\")       Physical Exam:    General: Alert, cooperative, no distress, appears stated age  Head:  Normocephalic, atraumatic, mucous membranes moist  Eyes:  Conjunctiva/corneas clear, EOM's intact     Neck:  Supple,  no adenopathy;      Lungs: Clear to auscultation bilaterally, no wheezes rhonchi rales are noted  Chest wall: No tenderness  Heart::  Regular rate and rhythm, S1 and S2 normal, no murmur, rub or gallop  Abdomen: Soft, non-tender, nondistended bowel sounds active  Extremities: No cyanosis, clubbing, or edema  Pulses: 2+ and symmetric all extremities  Skin:  No rashes or lesions  Neuro/psych: A&O x3. CN II through XII are grossly intact with appropriate affect              Lab Results   Component Value Date    GLUCOSE 134 (H) 10/09/2023    BUN 14 10/09/2023    CREATININE 0.67 10/09/2023    EGFR 100.2 10/09/2023    BCR 20.9 " 10/09/2023    K 4.4 10/09/2023    CO2 23.9 10/09/2023    CALCIUM 9.8 10/09/2023    ALBUMIN 4.30 08/17/2022    BILITOT 0.4 08/17/2022    AST 15 08/17/2022    ALT 15 08/17/2022     Results for orders placed in visit on 06/11/20    Adult Transthoracic Echo Complete W/ Cont if Necessary Per Protocol    Interpretation Summary  · Left ventricular wall thickness is consistent with mild to moderate asymmetric hypertrophy.  · Estimated EF = 60%.  · Left ventricular systolic function is normal.  · Left atrial cavity size is mildly dilated.  · Mild tricuspid valve regurgitation is present.     No results found for this or any previous visit.     Lab Results   Component Value Date    CHOL 200 02/26/2024    TRIG 317 (H) 02/26/2024    HDL 40 02/26/2024     (H) 02/26/2024      Results for orders placed during the hospital encounter of 12/14/22    Stress Test With Myocardial Perfusion One Day    Interpretation Summary    Myocardial perfusion imaging indicates a normal myocardial perfusion study with no evidence of ischemia.    Left ventricular ejection fraction is hyperdynamic (Calculated EF > 70%).    Impressions are consistent with a low risk study.    There is no prior study available for comparison.    Findings consistent with a normal ECG stress test.            Objective:          Allergies:  Allergies   Allergen Reactions    Atorvastatin GI Intolerance and Myalgia    Doxycycline Angioedema       Medication Review:     Current Outpatient Medications:     acetaminophen (TYLENOL) 500 MG tablet, Every Night., Disp: , Rfl:     Cholecalciferol (VITAMIN D3) 1.25 MG (34142 UT) capsule, VITAMIN D3 18880 UNIT CAPS, Disp: , Rfl:     clonazePAM (KlonoPIN) 0.5 MG tablet, Take 1 tablet by mouth Daily., Disp: 30 tablet, Rfl: 1    fexofenadine (ALLEGRA) 180 MG tablet, As Needed., Disp: , Rfl:     Flaxseed, Linseed, (FLAXSEED OIL) 1000 MG capsule, Daily., Disp: , Rfl:     lisinopril (PRINIVIL,ZESTRIL) 10 MG tablet, Take 1 tablet by  mouth Daily., Disp: 90 tablet, Rfl: 0    metFORMIN ER (GLUCOPHAGE-XR) 500 MG 24 hr tablet, TAKE 2 TABLETS BY MOUTH EVERY DAY, Disp: 180 tablet, Rfl: 0    Multiple Vitamin tablet, MULTIPLE VITAMIN TABS, Disp: , Rfl:     Ozempic, 0.25 or 0.5 MG/DOSE, 2 MG/3ML solution pen-injector, AS DIRECTED, 0.25 MG INJECTED WEEKLY, Disp: , Rfl:     propranolol LA (INDERAL LA) 60 MG 24 hr capsule, TAKE 1 CAPSULE BY MOUTH EVERY DAY, Disp: 90 capsule, Rfl: 2    rosuvastatin (CRESTOR) 10 MG tablet, Take 1 tablet by mouth Daily., Disp: 90 tablet, Rfl: 3    vitamin E 1000 UNIT capsule, Take 1 capsule by mouth Daily., Disp: , Rfl:     Family History:  Family History   Problem Relation Age of Onset    Diabetes Mother     Heart disease Father     Hyperlipidemia Father     Hypertension Brother     Sleep apnea Neg Hx        Past Medical History:  Past Medical History:   Diagnosis Date    Diabetes mellitus     Hyperlipidemia     Hypertension        Past surgical History:  Past Surgical History:   Procedure Laterality Date    BLADDER SURGERY      HYSTERECTOMY      REPAIR CHOANAL ATRESIA      SINUS SURGERY      TONSILLECTOMY         Social History:  Social History     Socioeconomic History    Marital status:    Tobacco Use    Smoking status: Former     Current packs/day: 0.00     Average packs/day: 1 pack/day for 10.0 years (10.0 ttl pk-yrs)     Types: Cigarettes     Start date: 1980     Quit date: 1990     Years since quittin.2    Smokeless tobacco: Never   Vaping Use    Vaping status: Never Used   Substance and Sexual Activity    Alcohol use: No    Drug use: No    Sexual activity: Defer       Review of Systems:  The following systems were reviewed as they relate to the cardiovascular system: Constitutional, Eyes, ENT, Cardiovascular, Respiratory, Gastrointestinal, Integumentary, Neurological, Psychiatric, Hematologic, Endocrine, Musculoskeletal, and Genitourinary. The pertinent cardiovascular findings are reported above  with all other cardiovascular points within those systems being negative.    Diagnostic Study Review:     Current Electrocardiogram:  Procedures          NOTE: The following portions of the patient's history were reviewed and updated this visit as appropriate: allergies, current medications, past family history, past medical history, past social history, past surgical history and problem list.

## 2024-03-12 ENCOUNTER — TRANSCRIBE ORDERS (OUTPATIENT)
Dept: ADMINISTRATIVE | Facility: HOSPITAL | Age: 59
End: 2024-03-12
Payer: COMMERCIAL

## 2024-03-12 DIAGNOSIS — E78.2 MIXED HYPERLIPIDEMIA: Primary | ICD-10-CM

## 2024-03-12 DIAGNOSIS — E11.9 DIABETES MELLITUS WITHOUT COMPLICATION: ICD-10-CM

## 2024-03-12 DIAGNOSIS — I10 HYPERTENSION, UNSPECIFIED TYPE: ICD-10-CM

## 2024-03-18 PROBLEM — E78.2 MIXED HYPERLIPIDEMIA: Status: ACTIVE | Noted: 2024-03-18

## 2024-03-21 ENCOUNTER — TELEPHONE (OUTPATIENT)
Dept: CARDIOLOGY | Facility: CLINIC | Age: 59
End: 2024-03-21
Payer: COMMERCIAL

## 2024-03-21 ENCOUNTER — PATIENT MESSAGE (OUTPATIENT)
Dept: CARDIOLOGY | Facility: CLINIC | Age: 59
End: 2024-03-21
Payer: COMMERCIAL

## 2024-03-21 NOTE — TELEPHONE ENCOUNTER
COOKIE fo the patient to call the office back and speak with myself or Marleny.   Per Dr. Gregg:  I sent a prescription for Nexletol for hyperlipidemia see if she can try this medication before we try for Repatha again    Me: Leqvio was denied by insurance.

## 2024-03-21 NOTE — TELEPHONE ENCOUNTER
From: Isidra Gann  Sent: 3/21/2024 11:28 AM EDT  To: [facility] Clinical [facility]  Subject: Cholesterol medication    EDILMA, What office are you located at

## 2024-03-21 NOTE — TELEPHONE ENCOUNTER
"      Hub staff attempted to follow warm transfer process and was unsuccessful     Caller: Isidra Gann \"Rajiv\"    Relationship to patient: Self    Best call back number: [phone]    Patient is needing: PT RETURNED THE CALL, HUB UNABLE TO REACH OFFICE. PLEASE REACH BACK OUT.           "

## 2024-03-21 NOTE — TELEPHONE ENCOUNTER
----- Message from Kal Leigh Rep sent at 3/21/2024  8:28 AM EDT -----  Regarding: RE: Peer to Peer for Leqvio  I left a message for the patient to call the office back. Marleny I told her to ask for you or me.  ----- Message -----  From: Elen Daniel MD  Sent: 3/21/2024   7:58 AM EDT  To: Kal Leigh Rep; #  Subject: RE: Peer to Peer for Leqvio                      I sent a prescription for Nexletol for hyperlipidemia see if she can try this medication before we try for Repatha again  ----- Message -----  From: Rolando Dixon RegSched Rep  Sent: 3/20/2024  10:30 AM EDT  To: Elen Daniel MD  Subject: Peer to Peer for Leqvio                          Rosalinda Daniel-  The insurance company denied her use of Leqvio. They state that a peer to peer can be done by calling [phone]. This doesn't have to be scheduled according to the insurance plan. Let me know the outcome if you decide to do the peer to peer.   Thanks!

## 2024-03-22 ENCOUNTER — TELEPHONE (OUTPATIENT)
Dept: CARDIOLOGY | Facility: CLINIC | Age: 59
End: 2024-03-22

## 2024-03-22 NOTE — TELEPHONE ENCOUNTER
"  Caller: Isidra Gann \"Rajiv\"    Relationship: Self    Best call back number:  [phone]    What is the best time to reach you: ANYTIME    Who are you requesting to speak with (clinical staff, provider,  specific staff member): CLINICAL    Do you know the name of the person who called: TANVI    What was the call regarding: PHARMACY IS REQUESTING NEW MEDICATION BE SENT TO [facility] WITH A PRIOR AUTHORIZATION.     Is it okay if the provider responds through TRAN.SLhart: YES        "

## 2024-03-28 RX ORDER — LISINOPRIL 10 MG/1
10 TABLET ORAL DAILY
Qty: 90 TABLET | Refills: 0 | Status: CANCELLED | OUTPATIENT
Start: 2024-03-28

## 2024-03-28 RX ORDER — LISINOPRIL 10 MG/1
10 TABLET ORAL DAILY
Qty: 90 TABLET | Refills: 0 | Status: SHIPPED | OUTPATIENT
Start: 2024-03-28

## 2024-08-25 DIAGNOSIS — E78.1 PURE HYPERTRIGLYCERIDEMIA: ICD-10-CM

## 2024-08-25 DIAGNOSIS — Z91.89 MULTIPLE RISK FACTORS FOR CORONARY ARTERY DISEASE: ICD-10-CM

## 2024-08-25 RX ORDER — PROPRANOLOL HCL 60 MG
CAPSULE, EXTENDED RELEASE 24HR ORAL
Qty: 90 CAPSULE | Refills: 2 | Status: CANCELLED | OUTPATIENT
Start: 2024-08-25

## 2024-08-26 DIAGNOSIS — E78.1 PURE HYPERTRIGLYCERIDEMIA: ICD-10-CM

## 2024-08-26 DIAGNOSIS — Z91.89 MULTIPLE RISK FACTORS FOR CORONARY ARTERY DISEASE: ICD-10-CM

## 2024-08-26 RX ORDER — PROPRANOLOL HCL 60 MG
CAPSULE, EXTENDED RELEASE 24HR ORAL
Qty: 90 CAPSULE | Refills: 2 | Status: SHIPPED | OUTPATIENT
Start: 2024-08-26

## 2024-09-30 ENCOUNTER — OFFICE VISIT (OUTPATIENT)
Dept: CARDIOLOGY | Facility: CLINIC | Age: 59
End: 2024-09-30
Payer: COMMERCIAL

## 2024-09-30 VITALS
HEIGHT: 63 IN | DIASTOLIC BLOOD PRESSURE: 79 MMHG | WEIGHT: 223 LBS | OXYGEN SATURATION: 98 % | HEART RATE: 67 BPM | SYSTOLIC BLOOD PRESSURE: 113 MMHG | BODY MASS INDEX: 39.51 KG/M2

## 2024-09-30 DIAGNOSIS — E11.9 TYPE 2 DIABETES MELLITUS WITHOUT COMPLICATION, WITHOUT LONG-TERM CURRENT USE OF INSULIN: ICD-10-CM

## 2024-09-30 DIAGNOSIS — E66.01 SEVERE OBESITY (BMI 35.0-39.9) WITH COMORBIDITY: ICD-10-CM

## 2024-09-30 DIAGNOSIS — E78.2 MIXED HYPERLIPIDEMIA: Primary | ICD-10-CM

## 2024-09-30 DIAGNOSIS — I10 ESSENTIAL HYPERTENSION: ICD-10-CM

## 2024-09-30 RX ORDER — ORAL SEMAGLUTIDE 3 MG/1
TABLET ORAL
COMMUNITY

## 2024-09-30 NOTE — PROGRESS NOTES
Cardiology Office Visit      Encounter Date:  09/30/2024    Patient ID:   Isidra Gann is a 58 y.o. female.    Reason For Followup:  F  Hyperlipidemia    Brief Clinical History:  Dear Tania Brumfield APRN    I had the pleasure of seeing Isidra Gann today. As you are well aware, this is a 58 y.o. female with no known history of coronary occlusive disease.  She does have significant risk factors that include obesity, dyslipidemia, diabetes mellitus type 2, hypertension and strong paternal family history of heart disease.  She is intolerant to all statins secondary to myalgias.      Nuclear stress testing performed 12/16/2022 showed normal myocardial perfusion study with no evidence of ischemia, low risk study.  TTE 6/16/2020: EF 60%, mild TR, mild-moderate asymmetric hypertrophy.     Today, the patient reports no symptoms of chest pain, pressure or tightness.  She denies any shortness of breath out of character for her.  No near syncopal or syncopal episodes.  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  She is chronically tired due to night shift work and daytime responsibilities.                   Interval History:    Tolerating current dose of statin  Denies any new cardiac symptoms  Denies any exertional chest pain  Shortness of breath somewhat better  No syncope no orthopnea no PND      Assessment & Plan    Impressions:    Familial hyperlipidemia with elevated LDL cholesterol  Hypertension  Diabetes mellitus type II  Fatigue  Obesity/Body mass index is 39.82 kg/m².   Patient is tolerating low-dose statin but still LDL is not at goal she was on a PCSK9 inhibitor but the insurance company does not want to pay for PCSK9 inhibitor secondary to patient calcium score being 0  Calcium score is 0  Normal screening vascular ultrasound study    Recommendations:  Recommend vascular screening and calcium score for  "further risk stratification  Prior stress test with no inducible ischemia  Recommend sleep study to further evaluate for possible underlying obstructive sleep apnea contributing to patient fatigue  Need for regular exercise and weight loss and risk factor modification reviewed and discussed with patient  Intolerant to Zetia  History of myalgia with statin therapy  Repatha coverage was denied by the pharmacy  Currently tolerating Crestor at 10 mg p.o. once a day but LDL is much better compared to before  Results of vascular screening and calcium score reviewed and discussed with the patient  Triglycerides are still elevated but patient is going to target on some exercise weight loss and better control of diabetes mellitus hemoglobin A1c is elevated 7.9 much worse compared to 6.4 from last year  Continued aggressive risk factor modification close monitoring and follow-up  Regular exercise and weight loss  Follow-up in office in 6 months        Vitals:  Vitals:    09/30/24 1527   BP: 113/79   Pulse: 67   SpO2: 98%   Weight: 101 kg (223 lb)   Height: 160 cm (63\")       Physical Exam:    General: Alert, cooperative, no distress, appears stated age  Head:  Normocephalic, atraumatic, mucous membranes moist  Eyes:  Conjunctiva/corneas clear, EOM's intact     Neck:  Supple,  no adenopathy;      Lungs: Clear to auscultation bilaterally, no wheezes rhonchi rales are noted  Chest wall: No tenderness  Heart::  Regular rate and rhythm, S1 and S2 normal, no murmur, rub or gallop  Abdomen: Soft, non-tender, nondistended bowel sounds active  Extremities: No cyanosis, clubbing, or edema  Pulses: 2+ and symmetric all extremities  Skin:  No rashes or lesions  Neuro/psych: A&O x3. CN II through XII are grossly intact with appropriate affect              Lab Results   Component Value Date    GLUCOSE 134 (H) 10/09/2023    BUN 14 10/09/2023    CREATININE 0.67 10/09/2023    EGFR 100.2 10/09/2023    BCR 20.9 10/09/2023    K 4.4 10/09/2023    " CO2 23.9 10/09/2023    CALCIUM 9.8 10/09/2023    ALBUMIN 4.30 08/17/2022    BILITOT 0.4 08/17/2022    AST 15 08/17/2022    ALT 15 08/17/2022     Results for orders placed in visit on 06/11/20    Adult Transthoracic Echo Complete W/ Cont if Necessary Per Protocol    Interpretation Summary  · Left ventricular wall thickness is consistent with mild to moderate asymmetric hypertrophy.  · Estimated EF = 60%.  · Left ventricular systolic function is normal.  · Left atrial cavity size is mildly dilated.  · Mild tricuspid valve regurgitation is present.     No results found for this or any previous visit.     Lab Results   Component Value Date    CHOL 200 02/26/2024    TRIG 317 (H) 02/26/2024    HDL 40 02/26/2024     (H) 02/26/2024      Results for orders placed during the hospital encounter of 12/14/22    Stress Test With Myocardial Perfusion One Day    Interpretation Summary    Myocardial perfusion imaging indicates a normal myocardial perfusion study with no evidence of ischemia.    Left ventricular ejection fraction is hyperdynamic (Calculated EF > 70%).    Impressions are consistent with a low risk study.    There is no prior study available for comparison.    Findings consistent with a normal ECG stress test.            Objective:          Allergies:  Allergies   Allergen Reactions    Atorvastatin GI Intolerance and Myalgia    Doxycycline Angioedema       Medication Review:     Current Outpatient Medications:     acetaminophen (TYLENOL) 500 MG tablet, Every Night., Disp: , Rfl:     clonazePAM (KlonoPIN) 0.5 MG tablet, Take 1 tablet by mouth Daily., Disp: 90 tablet, Rfl: 0    escitalopram (LEXAPRO) 5 MG tablet, Take 1 tablet by mouth Daily., Disp: 90 tablet, Rfl: 0    fexofenadine (ALLEGRA) 180 MG tablet, As Needed., Disp: , Rfl:     Flaxseed, Linseed, (FLAXSEED OIL) 1000 MG capsule, Daily., Disp: , Rfl:     lisinopril (PRINIVIL,ZESTRIL) 10 MG tablet, Take 1 tablet by mouth Daily., Disp: 90 tablet, Rfl: 0     metFORMIN ER (GLUCOPHAGE-XR) 500 MG 24 hr tablet, Take 1 tablet by mouth 2 (Two) Times a Day., Disp: 180 tablet, Rfl: 0    montelukast (SINGULAIR) 10 MG tablet, once a day, Disp: 90 tablet, Rfl: 0    Multiple Vitamin tablet, MULTIPLE VITAMIN TABS, Disp: , Rfl:     propranolol LA (INDERAL LA) 60 MG 24 hr capsule, TAKE 1 CAPSULE BY MOUTH EVERY DAY, Disp: 90 capsule, Rfl: 2    rosuvastatin (CRESTOR) 10 MG tablet, Take 1 tablet by mouth Daily., Disp: 90 tablet, Rfl: 3    Semaglutide (Rybelsus) 3 MG tablet, Take  by mouth., Disp: , Rfl:     vitamin E 1000 UNIT capsule, Take 1 capsule by mouth Daily., Disp: , Rfl:     Family History:  Family History   Problem Relation Age of Onset    Diabetes Mother     Heart disease Father     Hyperlipidemia Father     Hypertension Brother     Sleep apnea Neg Hx        Past Medical History:  Past Medical History:   Diagnosis Date    Diabetes mellitus     Hyperlipidemia     Hypertension        Past surgical History:  Past Surgical History:   Procedure Laterality Date    BLADDER SURGERY      HYSTERECTOMY      REPAIR CHOANAL ATRESIA      SINUS SURGERY      TONSILLECTOMY         Social History:  Social History     Socioeconomic History    Marital status:    Tobacco Use    Smoking status: Former     Current packs/day: 0.00     Average packs/day: 1 pack/day for 10.0 years (10.0 ttl pk-yrs)     Types: Cigarettes     Start date: 1980     Quit date: 1990     Years since quittin.7    Smokeless tobacco: Never   Vaping Use    Vaping status: Never Used   Substance and Sexual Activity    Alcohol use: No    Drug use: No    Sexual activity: Defer       Review of Systems:  The following systems were reviewed as they relate to the cardiovascular system: Constitutional, Eyes, ENT, Cardiovascular, Respiratory, Gastrointestinal, Integumentary, Neurological, Psychiatric, Hematologic, Endocrine, Musculoskeletal, and Genitourinary. The pertinent cardiovascular findings are reported above  with all other cardiovascular points within those systems being negative.    Diagnostic Study Review:     Current Electrocardiogram:    ECG 12 Lead    Date/Time: 9/30/2024 3:54 PM  Performed by: Elen Daniel MD    Authorized by: Elen Daniel MD  Comparison: compared with previous ECG   Similar to previous ECG  Rhythm: sinus rhythm  Rate: normal  BPM: 66  Conduction: conduction normal  ST Segments: ST segments normal  T Waves: T waves normal  QRS axis: normal    Clinical impression: normal ECG                NOTE: The following portions of the patient's history were reviewed and updated this visit as appropriate: allergies, current medications, past family history, past medical history, past social history, past surgical history and problem list.

## 2025-03-17 ENCOUNTER — OFFICE VISIT (OUTPATIENT)
Dept: CARDIOLOGY | Facility: CLINIC | Age: 60
End: 2025-03-17
Payer: COMMERCIAL

## 2025-03-17 VITALS
WEIGHT: 226.6 LBS | BODY MASS INDEX: 40.15 KG/M2 | HEIGHT: 63 IN | HEART RATE: 77 BPM | SYSTOLIC BLOOD PRESSURE: 120 MMHG | OXYGEN SATURATION: 96 % | DIASTOLIC BLOOD PRESSURE: 75 MMHG

## 2025-03-17 DIAGNOSIS — R06.09 DYSPNEA ON EXERTION: Primary | ICD-10-CM

## 2025-03-17 DIAGNOSIS — E78.2 MIXED HYPERLIPIDEMIA: ICD-10-CM

## 2025-03-17 DIAGNOSIS — I10 ESSENTIAL HYPERTENSION: ICD-10-CM

## 2025-03-17 PROCEDURE — 99214 OFFICE O/P EST MOD 30 MIN: CPT | Performed by: INTERNAL MEDICINE

## 2025-03-17 PROCEDURE — 93000 ELECTROCARDIOGRAM COMPLETE: CPT | Performed by: INTERNAL MEDICINE

## 2025-03-17 RX ORDER — ROSUVASTATIN CALCIUM 20 MG/1
20 TABLET, COATED ORAL DAILY
Qty: 90 TABLET | Refills: 3 | Status: SHIPPED | OUTPATIENT
Start: 2025-03-17

## 2025-03-17 RX ORDER — PIOGLITAZONE 30 MG/1
30 TABLET ORAL DAILY
COMMUNITY
Start: 2025-03-10

## 2025-03-17 NOTE — PROGRESS NOTES
Cardiology Office Visit      Encounter Date:  03/17/2025    Patient ID:   Isidra Gann is a 59 y.o. female.    Reason For Followup:    Hyperlipidemia    Brief Clinical History:  Dear Tania Brumfield APRN    I had the pleasure of seeing Isidra Gann today. As you are well aware, this is a 59 y.o. female with no known history of coronary occlusive disease.  She does have significant risk factors that include obesity, dyslipidemia, diabetes mellitus type 2, hypertension and strong paternal family history of heart disease.  She is intolerant to all statins secondary to myalgias.      Nuclear stress testing performed 12/16/2022 showed normal myocardial perfusion study with no evidence of ischemia, low risk study.  TTE 6/16/2020: EF 60%, mild TR, mild-moderate asymmetric hypertrophy.                     Interval History:    Tolerating current dose of statin  Denies any new cardiac symptoms  Denies any exertional chest pain  Shortness of breath somewhat better  No syncope no orthopnea no PND      Assessment & Plan    Impressions:    Familial hyperlipidemia with elevated LDL cholesterol  Hypertension  Diabetes mellitus type II  Fatigue  Obesity/Body mass index is 39.82 kg/m².   Patient is tolerating low-dose statin but still LDL is not at goal she was on a PCSK9 inhibitor but the insurance company does not want to pay for PCSK9 inhibitor secondary to patient calcium score being 0  Calcium score is 0  Normal screening vascular ultrasound study    Recommendations:  Recommend vascular screening and calcium score for further risk stratification  Prior stress test with no inducible ischemia  Recommend sleep study to further evaluate for possible underlying obstructive sleep apnea contributing to patient fatigue  Need for regular exercise and weight loss and risk factor modification reviewed and discussed with patient  Intolerant to Zetia  Increase the dose of Crestor from 10 to 20 mg p.o. once a day  Continue  "aggressive risk factor modification  Regular exercise and weight loss  Recheck lipids with primary care physician in the next 3 to 6 months  Results of vascular screening and calcium score reviewed and discussed with the patient  Continued aggressive risk factor modification close monitoring and follow-up  Regular exercise and weight loss  Follow-up in office in 6 months      Vitals:  Vitals:    03/17/25 1501   BP: 120/75   Pulse: 77   SpO2: 96%   Weight: 103 kg (226 lb 9.6 oz)   Height: 160 cm (63\")       Physical Exam:    General: Alert, cooperative, no distress, appears stated age  Head:  Normocephalic, atraumatic, mucous membranes moist  Eyes:  Conjunctiva/corneas clear, EOM's intact     Neck:  Supple,  no adenopathy;      Lungs: Clear to auscultation bilaterally, no wheezes rhonchi rales are noted  Chest wall: No tenderness  Heart::  Regular rate and rhythm, S1 and S2 normal, no murmur, rub or gallop  Abdomen: Soft, non-tender, nondistended bowel sounds active  Extremities: No cyanosis, clubbing, or edema  Pulses: 2+ and symmetric all extremities  Skin:  No rashes or lesions  Neuro/psych: A&O x3. CN II through XII are grossly intact with appropriate affect              Lab Results   Component Value Date    GLUCOSE 134 (H) 10/09/2023    BUN 14 10/09/2023    CREATININE 0.67 10/09/2023    EGFR 100.2 10/09/2023    BCR 20.9 10/09/2023    K 4.4 10/09/2023    CO2 23.9 10/09/2023    CALCIUM 9.8 10/09/2023    ALBUMIN 4.30 08/17/2022    BILITOT 0.4 08/17/2022    AST 15 08/17/2022    ALT 15 08/17/2022     Results for orders placed in visit on 06/11/20    Adult Transthoracic Echo Complete W/ Cont if Necessary Per Protocol    Interpretation Summary  · Left ventricular wall thickness is consistent with mild to moderate asymmetric hypertrophy.  · Estimated EF = 60%.  · Left ventricular systolic function is normal.  · Left atrial cavity size is mildly dilated.  · Mild tricuspid valve regurgitation is present.     No results " found for this or any previous visit.     Lab Results   Component Value Date    CHOL 200 02/26/2024    TRIG 317 (H) 02/26/2024    HDL 40 02/26/2024     (H) 02/26/2024      Results for orders placed during the hospital encounter of 12/14/22    Stress Test With Myocardial Perfusion One Day    Interpretation Summary    Myocardial perfusion imaging indicates a normal myocardial perfusion study with no evidence of ischemia.    Left ventricular ejection fraction is hyperdynamic (Calculated EF > 70%).    Impressions are consistent with a low risk study.    There is no prior study available for comparison.    Findings consistent with a normal ECG stress test.            Objective:          Allergies:  Allergies   Allergen Reactions    Atorvastatin GI Intolerance and Myalgia    Doxycycline Angioedema       Medication Review:     Current Outpatient Medications:     acetaminophen (TYLENOL) 500 MG tablet, Every Night., Disp: , Rfl:     clonazePAM (KlonoPIN) 0.5 MG tablet, Take 1 tablet by mouth Daily. (Patient taking differently: Take 0.5 tablets by mouth Daily. PRN), Disp: 90 tablet, Rfl: 0    escitalopram (LEXAPRO) 5 MG tablet, Take 1 tablet by mouth Daily., Disp: 90 tablet, Rfl: 0    fexofenadine (ALLEGRA) 180 MG tablet, As Needed., Disp: , Rfl:     Flaxseed, Linseed, (FLAXSEED OIL) 1000 MG capsule, Daily., Disp: , Rfl:     lisinopril (PRINIVIL,ZESTRIL) 10 MG tablet, Take 1 tablet by mouth Daily., Disp: 90 tablet, Rfl: 0    metFORMIN ER (GLUCOPHAGE-XR) 500 MG 24 hr tablet, Take 1 tablet by mouth 2 (Two) Times a Day., Disp: 180 tablet, Rfl: 0    pioglitazone (ACTOS) 30 MG tablet, Take 1 tablet by mouth Daily., Disp: , Rfl:     propranolol LA (INDERAL LA) 60 MG 24 hr capsule, TAKE 1 CAPSULE BY MOUTH EVERY DAY, Disp: 90 capsule, Rfl: 2    vitamin E 1000 UNIT capsule, Take 1 capsule by mouth Daily., Disp: , Rfl:     montelukast (SINGULAIR) 10 MG tablet, once a day, Disp: 90 tablet, Rfl: 0    rosuvastatin (CRESTOR) 20 MG  tablet, Take 1 tablet by mouth Daily., Disp: 90 tablet, Rfl: 3    Semaglutide (Rybelsus) 3 MG tablet, Take  by mouth., Disp: , Rfl:     Family History:  Family History   Problem Relation Age of Onset    Diabetes Mother     Heart disease Father     Hyperlipidemia Father     Hypertension Brother     Sleep apnea Neg Hx        Past Medical History:  Past Medical History:   Diagnosis Date    Diabetes mellitus     Hyperlipidemia     Hypertension        Past surgical History:  Past Surgical History:   Procedure Laterality Date    BLADDER SURGERY      HYSTERECTOMY      REPAIR CHOANAL ATRESIA      SINUS SURGERY      TONSILLECTOMY         Social History:  Social History     Socioeconomic History    Marital status:    Tobacco Use    Smoking status: Former     Current packs/day: 0.00     Average packs/day: 1 pack/day for 10.0 years (10.0 ttl pk-yrs)     Types: Cigarettes     Start date: 1980     Quit date: 1990     Years since quittin.2    Smokeless tobacco: Never   Vaping Use    Vaping status: Never Used   Substance and Sexual Activity    Alcohol use: No    Drug use: No    Sexual activity: Defer       Review of Systems:  The following systems were reviewed as they relate to the cardiovascular system: Constitutional, Eyes, ENT, Cardiovascular, Respiratory, Gastrointestinal, Integumentary, Neurological, Psychiatric, Hematologic, Endocrine, Musculoskeletal, and Genitourinary. The pertinent cardiovascular findings are reported above with all other cardiovascular points within those systems being negative.    Diagnostic Study Review:     Current Electrocardiogram:    ECG 12 Lead    Date/Time: 3/17/2025 3:46 PM  Performed by: Elen Daniel MD    Authorized by: Elen Daniel MD  Comparison: compared with previous ECG   Similar to previous ECG  Rhythm: sinus rhythm  Rate: normal  BPM: 77  Conduction: conduction normal  ST Segments: ST segments normal  T Waves: T waves normal  QRS axis:  normal    Clinical impression: normal ECG                NOTE: The following portions of the patient's history were reviewed and updated this visit as appropriate: allergies, current medications, past family history, past medical history, past social history, past surgical history and problem list.